# Patient Record
Sex: FEMALE | Race: WHITE | NOT HISPANIC OR LATINO | URBAN - METROPOLITAN AREA
[De-identification: names, ages, dates, MRNs, and addresses within clinical notes are randomized per-mention and may not be internally consistent; named-entity substitution may affect disease eponyms.]

---

## 2018-01-23 ENCOUNTER — INPATIENT (INPATIENT)
Facility: HOSPITAL | Age: 83
LOS: 5 days | Discharge: HOSPICE | End: 2018-01-29
Attending: FAMILY MEDICINE | Admitting: FAMILY MEDICINE
Payer: MEDICARE

## 2018-01-23 VITALS
HEIGHT: 66 IN | DIASTOLIC BLOOD PRESSURE: 55 MMHG | SYSTOLIC BLOOD PRESSURE: 127 MMHG | OXYGEN SATURATION: 100 % | WEIGHT: 149.91 LBS | TEMPERATURE: 99 F | HEART RATE: 100 BPM | RESPIRATION RATE: 20 BRPM

## 2018-01-23 DIAGNOSIS — Z96.643 PRESENCE OF ARTIFICIAL HIP JOINT, BILATERAL: Chronic | ICD-10-CM

## 2018-01-23 LAB
ALBUMIN SERPL ELPH-MCNC: 2.4 G/DL — LOW (ref 3.3–5)
ALBUMIN SERPL ELPH-MCNC: 2.9 G/DL — LOW (ref 3.3–5)
ALP SERPL-CCNC: 120 U/L — SIGNIFICANT CHANGE UP (ref 40–120)
ALP SERPL-CCNC: 98 U/L — SIGNIFICANT CHANGE UP (ref 40–120)
ALT FLD-CCNC: 45 U/L — SIGNIFICANT CHANGE UP (ref 12–78)
ALT FLD-CCNC: 59 U/L — SIGNIFICANT CHANGE UP (ref 12–78)
ANION GAP SERPL CALC-SCNC: 13 MMOL/L — SIGNIFICANT CHANGE UP (ref 5–17)
ANION GAP SERPL CALC-SCNC: 18 MMOL/L — HIGH (ref 5–17)
ANISOCYTOSIS BLD QL: SLIGHT — SIGNIFICANT CHANGE UP
APPEARANCE UR: (no result)
APTT BLD: 30 SEC — SIGNIFICANT CHANGE UP (ref 27.5–37.4)
AST SERPL-CCNC: 20 U/L — SIGNIFICANT CHANGE UP (ref 15–37)
AST SERPL-CCNC: 23 U/L — SIGNIFICANT CHANGE UP (ref 15–37)
BASOPHILS # BLD AUTO: 0 K/UL — SIGNIFICANT CHANGE UP (ref 0–0.2)
BILIRUB SERPL-MCNC: 0.3 MG/DL — SIGNIFICANT CHANGE UP (ref 0.2–1.2)
BILIRUB SERPL-MCNC: 0.3 MG/DL — SIGNIFICANT CHANGE UP (ref 0.2–1.2)
BILIRUB UR-MCNC: NEGATIVE — SIGNIFICANT CHANGE UP
BUN SERPL-MCNC: 120 MG/DL — HIGH (ref 7–23)
BUN SERPL-MCNC: 129 MG/DL — HIGH (ref 7–23)
CALCIUM SERPL-MCNC: 9 MG/DL — SIGNIFICANT CHANGE UP (ref 8.5–10.1)
CALCIUM SERPL-MCNC: 9.5 MG/DL — SIGNIFICANT CHANGE UP (ref 8.5–10.1)
CHLORIDE SERPL-SCNC: 104 MMOL/L — SIGNIFICANT CHANGE UP (ref 96–108)
CHLORIDE SERPL-SCNC: 110 MMOL/L — HIGH (ref 96–108)
CO2 SERPL-SCNC: 13 MMOL/L — LOW (ref 22–31)
CO2 SERPL-SCNC: 16 MMOL/L — LOW (ref 22–31)
COLOR SPEC: (no result)
COMMENT - URINE: SIGNIFICANT CHANGE UP
CREAT SERPL-MCNC: 6.39 MG/DL — HIGH (ref 0.5–1.3)
CREAT SERPL-MCNC: 6.98 MG/DL — HIGH (ref 0.5–1.3)
DIFF PNL FLD: (no result)
EOSINOPHIL # BLD AUTO: 0 K/UL — SIGNIFICANT CHANGE UP (ref 0–0.5)
GLUCOSE SERPL-MCNC: 103 MG/DL — HIGH (ref 70–99)
GLUCOSE SERPL-MCNC: 132 MG/DL — HIGH (ref 70–99)
GLUCOSE UR QL: NEGATIVE MG/DL — SIGNIFICANT CHANGE UP
HCT VFR BLD CALC: 34.6 % — SIGNIFICANT CHANGE UP (ref 34.5–45)
HGB BLD-MCNC: 11.4 G/DL — LOW (ref 11.5–15.5)
INR BLD: 1.18 RATIO — HIGH (ref 0.88–1.16)
KETONES UR-MCNC: (no result)
LEUKOCYTE ESTERASE UR-ACNC: (no result)
LIDOCAIN IGE QN: 245 U/L — SIGNIFICANT CHANGE UP (ref 73–393)
LYMPHOCYTES # BLD AUTO: 1.9 K/UL — SIGNIFICANT CHANGE UP (ref 1–3.3)
LYMPHOCYTES # BLD AUTO: 6 % — LOW (ref 13–44)
MAGNESIUM SERPL-MCNC: 2.8 MG/DL — HIGH (ref 1.6–2.6)
MANUAL DIF COMMENT BLD-IMP: SIGNIFICANT CHANGE UP
MCHC RBC-ENTMCNC: 30.2 PG — SIGNIFICANT CHANGE UP (ref 27–34)
MCHC RBC-ENTMCNC: 32.9 GM/DL — SIGNIFICANT CHANGE UP (ref 32–36)
MCV RBC AUTO: 91.6 FL — SIGNIFICANT CHANGE UP (ref 80–100)
MONOCYTES # BLD AUTO: 1 K/UL — HIGH (ref 0–0.9)
MONOCYTES NFR BLD AUTO: 2 % — SIGNIFICANT CHANGE UP (ref 2–14)
NEUTROPHILS # BLD AUTO: 19.7 K/UL — HIGH (ref 1.8–7.4)
NEUTROPHILS NFR BLD AUTO: 92 % — HIGH (ref 43–77)
NITRITE UR-MCNC: POSITIVE
PH UR: 8 — SIGNIFICANT CHANGE UP (ref 5–8)
PLAT MORPH BLD: NORMAL — SIGNIFICANT CHANGE UP
PLATELET # BLD AUTO: 589 K/UL — HIGH (ref 150–400)
POLYCHROMASIA BLD QL SMEAR: SLIGHT — SIGNIFICANT CHANGE UP
POTASSIUM SERPL-MCNC: 6.1 MMOL/L — HIGH (ref 3.5–5.3)
POTASSIUM SERPL-MCNC: 6.9 MMOL/L — CRITICAL HIGH (ref 3.5–5.3)
POTASSIUM SERPL-SCNC: 6.1 MMOL/L — HIGH (ref 3.5–5.3)
POTASSIUM SERPL-SCNC: 6.9 MMOL/L — CRITICAL HIGH (ref 3.5–5.3)
PROT SERPL-MCNC: 7.8 GM/DL — SIGNIFICANT CHANGE UP (ref 6–8.3)
PROT SERPL-MCNC: 9.4 GM/DL — HIGH (ref 6–8.3)
PROT UR-MCNC: 500 MG/DL
PROTHROM AB SERPL-ACNC: 12.8 SEC — HIGH (ref 9.8–12.7)
RBC # BLD: 3.78 M/UL — LOW (ref 3.8–5.2)
RBC # FLD: 14 % — SIGNIFICANT CHANGE UP (ref 10.3–14.5)
RBC BLD AUTO: SIGNIFICANT CHANGE UP
RBC CASTS # UR COMP ASSIST: (no result) /HPF (ref 0–4)
SODIUM SERPL-SCNC: 135 MMOL/L — SIGNIFICANT CHANGE UP (ref 135–145)
SODIUM SERPL-SCNC: 139 MMOL/L — SIGNIFICANT CHANGE UP (ref 135–145)
SP GR SPEC: 1.01 — SIGNIFICANT CHANGE UP (ref 1.01–1.02)
UROBILINOGEN FLD QL: NEGATIVE MG/DL — SIGNIFICANT CHANGE UP
WBC # BLD: 22.7 K/UL — HIGH (ref 3.8–10.5)
WBC # FLD AUTO: 22.7 K/UL — HIGH (ref 3.8–10.5)
WBC UR QL: (no result)

## 2018-01-23 PROCEDURE — 93010 ELECTROCARDIOGRAM REPORT: CPT

## 2018-01-23 PROCEDURE — 71045 X-RAY EXAM CHEST 1 VIEW: CPT | Mod: 26

## 2018-01-23 PROCEDURE — 99285 EMERGENCY DEPT VISIT HI MDM: CPT

## 2018-01-23 RX ORDER — CEFTRIAXONE 500 MG/1
1000 INJECTION, POWDER, FOR SOLUTION INTRAMUSCULAR; INTRAVENOUS ONCE
Qty: 0 | Refills: 0 | Status: COMPLETED | OUTPATIENT
Start: 2018-01-23 | End: 2018-01-23

## 2018-01-23 RX ORDER — SODIUM CHLORIDE 9 MG/ML
2000 INJECTION INTRAMUSCULAR; INTRAVENOUS; SUBCUTANEOUS ONCE
Qty: 0 | Refills: 0 | Status: COMPLETED | OUTPATIENT
Start: 2018-01-23 | End: 2018-01-23

## 2018-01-23 RX ORDER — SODIUM BICARBONATE 1 MEQ/ML
0.17 SYRINGE (ML) INTRAVENOUS
Qty: 150 | Refills: 0 | Status: DISCONTINUED | OUTPATIENT
Start: 2018-01-23 | End: 2018-01-24

## 2018-01-23 RX ORDER — CEFTRIAXONE 500 MG/1
1 INJECTION, POWDER, FOR SOLUTION INTRAMUSCULAR; INTRAVENOUS ONCE
Qty: 0 | Refills: 0 | Status: DISCONTINUED | OUTPATIENT
Start: 2018-01-23 | End: 2018-01-23

## 2018-01-23 RX ORDER — CALCIUM GLUCONATE 100 MG/ML
1 VIAL (ML) INTRAVENOUS ONCE
Qty: 0 | Refills: 0 | Status: COMPLETED | OUTPATIENT
Start: 2018-01-23 | End: 2018-01-23

## 2018-01-23 RX ORDER — DOCUSATE SODIUM 100 MG
100 CAPSULE ORAL THREE TIMES A DAY
Qty: 0 | Refills: 0 | Status: DISCONTINUED | OUTPATIENT
Start: 2018-01-23 | End: 2018-01-29

## 2018-01-23 RX ORDER — SODIUM POLYSTYRENE SULFONATE 4.1 MEQ/G
30 POWDER, FOR SUSPENSION ORAL ONCE
Qty: 0 | Refills: 0 | Status: COMPLETED | OUTPATIENT
Start: 2018-01-23 | End: 2018-01-23

## 2018-01-23 RX ORDER — SODIUM CHLORIDE 9 MG/ML
1000 INJECTION INTRAMUSCULAR; INTRAVENOUS; SUBCUTANEOUS ONCE
Qty: 0 | Refills: 0 | Status: COMPLETED | OUTPATIENT
Start: 2018-01-23 | End: 2018-01-23

## 2018-01-23 RX ORDER — CARVEDILOL PHOSPHATE 80 MG/1
6.25 CAPSULE, EXTENDED RELEASE ORAL EVERY 12 HOURS
Qty: 0 | Refills: 0 | Status: DISCONTINUED | OUTPATIENT
Start: 2018-01-23 | End: 2018-01-29

## 2018-01-23 RX ORDER — SENNA PLUS 8.6 MG/1
2 TABLET ORAL AT BEDTIME
Qty: 0 | Refills: 0 | Status: DISCONTINUED | OUTPATIENT
Start: 2018-01-23 | End: 2018-01-29

## 2018-01-23 RX ORDER — ONDANSETRON 8 MG/1
4 TABLET, FILM COATED ORAL EVERY 6 HOURS
Qty: 0 | Refills: 0 | Status: DISCONTINUED | OUTPATIENT
Start: 2018-01-23 | End: 2018-01-29

## 2018-01-23 RX ORDER — LEVOTHYROXINE SODIUM 125 MCG
50 TABLET ORAL DAILY
Qty: 0 | Refills: 0 | Status: DISCONTINUED | OUTPATIENT
Start: 2018-01-23 | End: 2018-01-29

## 2018-01-23 RX ORDER — INSULIN HUMAN 100 [IU]/ML
10 INJECTION, SOLUTION SUBCUTANEOUS ONCE
Qty: 0 | Refills: 0 | Status: COMPLETED | OUTPATIENT
Start: 2018-01-23 | End: 2018-01-23

## 2018-01-23 RX ORDER — SODIUM BICARBONATE 1 MEQ/ML
50 SYRINGE (ML) INTRAVENOUS ONCE
Qty: 0 | Refills: 0 | Status: COMPLETED | OUTPATIENT
Start: 2018-01-23 | End: 2018-01-23

## 2018-01-23 RX ORDER — DEXTROSE 50 % IN WATER 50 %
50 SYRINGE (ML) INTRAVENOUS ONCE
Qty: 0 | Refills: 0 | Status: COMPLETED | OUTPATIENT
Start: 2018-01-23 | End: 2018-01-23

## 2018-01-23 RX ORDER — SIMVASTATIN 20 MG/1
20 TABLET, FILM COATED ORAL AT BEDTIME
Qty: 0 | Refills: 0 | Status: DISCONTINUED | OUTPATIENT
Start: 2018-01-23 | End: 2018-01-29

## 2018-01-23 RX ADMIN — Medication 50 MILLIEQUIVALENT(S): at 16:52

## 2018-01-23 RX ADMIN — SODIUM CHLORIDE 2000 MILLILITER(S): 9 INJECTION INTRAMUSCULAR; INTRAVENOUS; SUBCUTANEOUS at 16:46

## 2018-01-23 RX ADMIN — CEFTRIAXONE 1000 MILLIGRAM(S): 500 INJECTION, POWDER, FOR SOLUTION INTRAMUSCULAR; INTRAVENOUS at 18:32

## 2018-01-23 RX ADMIN — Medication 75 MEQ/KG/HR: at 20:30

## 2018-01-23 RX ADMIN — Medication 100 GRAM(S): at 17:18

## 2018-01-23 RX ADMIN — INSULIN HUMAN 10 UNIT(S): 100 INJECTION, SOLUTION SUBCUTANEOUS at 16:52

## 2018-01-23 RX ADMIN — Medication 50 MILLILITER(S): at 16:42

## 2018-01-23 NOTE — ED ADULT NURSE REASSESSMENT NOTE - NS ED NURSE REASSESS COMMENT FT1
Pt. daughter at bedside to assist with soothing pt. Pt. very anxious when moved. Will continue to monitor.

## 2018-01-23 NOTE — H&P ADULT - NSHPPHYSICALEXAM_GEN_ALL_CORE
Vital Signs Last 24 Hrs  T(C): 36 (23 Jan 2018 15:43), Max: 37.1 (23 Jan 2018 13:36)  T(F): 96.8 (23 Jan 2018 15:43), Max: 98.7 (23 Jan 2018 13:36)  HR: 100 (23 Jan 2018 13:36) (100 - 100)  BP: 127/55 (23 Jan 2018 13:36) (127/55 - 127/55)  RR: 20 (23 Jan 2018 13:36) (20 - 20)  SpO2: 100% (23 Jan 2018 13:36) (100% - 100%)

## 2018-01-23 NOTE — H&P ADULT - ASSESSMENT
89 y/o F PMHx significant for HTN, Hypothyroidism, Dementia, anxiety, depression, and CKD3 who was BIBA from [facility] for further evaluation of decreased PO intake, increased lethargy over the past few days. Per Assisted living documentation the patient had signs of possible hematuria vs vaginal bleeding. The patient was unable to provide adequate hx due to her decreased mental status. In the ED the patient was found to have several lab abnormalities -> WBC 22.7, , K 6.9, BUN/Cr, 129/6.98, UA (+). In the ED the patient was given Ca gluconate 1g IV x 1, Reg Insulin 10units IV x 1, NaCO3 50mEq IV x 1, Kayexalate 30g po x 1, and D50 x 1amp, NS x 2L, and Ceftriaxone 1g IV x 1. In the ED Nephrology was consulted for further management. 89 y/o F PMHx significant for HTN, Hypothyroidism, Dementia, anxiety, depression, and CKD3 who was BIBA from [facility] for further evaluation of decreased PO intake, increased lethargy over the past few days. Per Assisted living documentation the patient had signs of possible hematuria vs vaginal bleeding. The patient was unable to provide adequate hx due to her decreased mental status. In the ED the patient was found to have several lab abnormalities -> WBC 22.7, , K 6.9, BUN/Cr, 129/6.98, UA (+). In the ED the patient was given Ca gluconate 1g IV x 1, Reg Insulin 10units IV x 1, NaCO3 50mEq IV x 1, Kayexalate 30g po x 1, and D50 x 1amp, NS x 2L, and Ceftriaxone 1g IV x 1. In the ED Nephrology was consulted for further management.     #Toxic-Metabolic Encephalopathy -> Multifactorial due to  #Acute on Chronic Renal Failure with Hyperkalemia   #Hematuria due to Complicated UTI  #Dehydration – Pre-renal Azotemia   ~admit to Telemetry  ~s/p hyperkalemia cocktail (received Ca gluconate 1g IV x 1, Reg Insulin 10units IV x 1, NaCO3 50mEq IV x 1, Kayexalate 30g po x 1, and D50 x 1amp, NS x 2L in the ED)  ~will cont. NaHCO3 gtt per protocol  ~serial labs  ~gong catheter inserted in the ED  ~strict I/Os  ~fall precautions  ~f/u urine studies  ~f/u PAN C+S  ~cont. Ceftriaxone 1g IV daily  ~f/u w/ Nephrology  ~per Nephrology no need for emergent dialysis at this time  ~hold all nephrotoxic agents (takes Enalapril, and Lasix)    2)Advance Care Planning  ~the patient’s advance directives were discussed extensively at the bedside with the patient’s HCP Chayo Mcintosh (daughter) [phone] and the patient’s current code status (DNR) is to remain in place. MOLST forms as well as appropriate [facility] inpatient documentation were signed accordingly. Total time; 16 minutes.    3)CAD/HTN  ~cont. Carvedilol 6.25mg po q12h  ~will hold Enalapril 5mg po daily for now    4)Hypothyroidism  ~cont. Levothyroxine 50mcg po qAM    5)Depression/Anxiety  ~takes Amitriptyline 25mg po qHS    6)Hyperlipidemia  ~takes Simvastatin 20mg po qHS (Renal dosing is 5mg po qHS)    7)Vte ppx  ~cont. Heparin sq 89 y/o F PMHx significant for HTN, Hypothyroidism, Dementia, anxiety, depression, and CKD3 who was BIBA from [facility] for further evaluation of decreased PO intake, increased lethargy over the past few days. Per Assisted living documentation the patient had signs of possible hematuria vs vaginal bleeding. The patient was unable to provide adequate hx due to her decreased mental status. In the ED the patient was found to have several lab abnormalities -> WBC 22.7, , K 6.9, BUN/Cr, 129/6.98, UA (+). In the ED the patient was given Ca gluconate 1g IV x 1, Reg Insulin 10units IV x 1, NaCO3 50mEq IV x 1, Kayexalate 30g po x 1, and D50 x 1amp, NS x 2L, and Ceftriaxone 1g IV x 1. In the ED Nephrology was consulted for further management.     #Toxic-Metabolic Encephalopathy -> Multifactorial due to  #Acute on Chronic Renal Failure with Hyperkalemia   #Hematuria due to Complicated UTI  #Dehydration – Pre-renal Azotemia   ~admit to Telemetry  ~s/p hyperkalemia cocktail (received Ca gluconate 1g IV x 1, Reg Insulin 10units IV x 1, NaCO3 50mEq IV x 1, Kayexalate 30g po x 1, and D50 x 1amp, NS x 2L in the ED)  ~will cont. NaHCO3 gtt per protocol  ~serial labs  ~gong catheter inserted in the ED  ~strict I/Os  ~fall precautions  ~f/u urine studies  ~f/u PAN C+S  ~cont. Ceftriaxone 1g IV daily  ~f/u w/ Nephrology  ~per Nephrology no need for emergent dialysis at this time  ~hold all nephrotoxic agents (takes Enalapril, and Lasix)    2)Advance Care Planning  ~the patient’s advance directives were discussed extensively at the bedside with the patient’s HCP Chayo Mcintosh (daughter) [phone] and the patient’s current code status (DNR) is to remain in place. MOLST forms as well as appropriate [facility] inpatient documentation were signed accordingly. HCP/family do not want aggressive measures at this time. IV Fluids and Antibiotics are acceptable. Total time; 16 minutes.    3)CAD/HTN  ~cont. Carvedilol 6.25mg po q12h  ~will hold Enalapril 5mg po daily for now    4)Hypothyroidism  ~cont. Levothyroxine 50mcg po qAM    5)Depression/Anxiety  ~takes Amitriptyline 25mg po qHS    6)Hyperlipidemia  ~takes Simvastatin 20mg po qHS (Renal dosing is 5mg po qHS)    7)Vte ppx  ~cont. Heparin sq 89 y/o F PMHx significant for HTN, Hypothyroidism, Dementia, anxiety, depression, and CKD3 who was BIBA from [facility] for further evaluation of decreased PO intake, increased lethargy over the past few days. Per Assisted living documentation the patient had signs of possible hematuria vs vaginal bleeding. The patient was unable to provide adequate hx due to her decreased mental status. In the ED the patient was found to have several lab abnormalities -> WBC 22.7, , K 6.9, BUN/Cr, 129/6.98, UA (+). In the ED the patient was given Ca gluconate 1g IV x 1, Reg Insulin 10units IV x 1, NaCO3 50mEq IV x 1, Kayexalate 30g po x 1, and D50 x 1amp, NS x 2L, and Ceftriaxone 1g IV x 1. In the ED Nephrology was consulted for further management.     #Toxic-Metabolic Encephalopathy -> Multifactorial due to  #Sepsis due to Complicated UTI   #Acute on Chronic Renal Failure Hyperkalemia   #Dehydration – Pre-renal Azotemia   ~admit to Medicine  ~s/p hyperkalemia cocktail (received Ca gluconate 1g IV x 1, Reg Insulin 10units IV x 1, NaCO3 50mEq IV x 1, Kayexalate 30g po x 1, and D50 x 1amp, NS x 2L in the ED)  ~will cont. NaHCO3 gtt per protocol  ~serial labs  ~gong catheter inserted in the ED  ~strict I/Os  ~fall precautions  ~f/u urine studies  ~f/u PAN C+S  ~cont. Ceftriaxone 1g IV daily  ~f/u w/ Nephrology  ~per Nephrology no need for emergent dialysis at this time  ~hold all nephrotoxic agents (takes Enalapril, and Lasix)    2)Advance Care Planning  ~the patient’s advance directives were discussed extensively at the bedside with the patient’s HCP Chayo Mcintosh (daughter) [phone] and the patient’s current code status (DNR) is to remain in place. MOLST forms as well as appropriate [facility] inpatient documentation were signed accordingly. HCP/family do not want aggressive measures at this time and are agreeable to comfort measures. IV Fluids and Antibiotics are acceptable. Total time; 16 minutes.  ~f/u w/ Palliative care consultation in the am    3)CAD/HTN  ~cont. Carvedilol 6.25mg po q12h  ~will hold Enalapril 5mg po daily for now    4)Hypothyroidism  ~cont. Levothyroxine 50mcg po qAM    5)Depression/Anxiety  ~takes Amitriptyline 25mg po qHS    6)Hyperlipidemia  ~takes Simvastatin 20mg po qHS (Renal dosing is 5mg po qHS)    7)Vte ppx  ~cont. SCDs (pt w/ notable hematuria please reassess in the am)

## 2018-01-23 NOTE — CONSULT NOTE ADULT - ASSESSMENT
89 y/o F PMHx significant for HTN, Hypothyroidism, Dementia, anxiety, depression, and CKD3 who was BIBA from [facility] for further evaluation of decreased PO intake, increased lethargy over the past few days. Per Assisted living documentation the patient had signs of possible hematuria vs vaginal bleeding. The patient was unable to provide adequate hx due to her decreased mental status. In the ED the patient was found to have several lab abnormalities -> WBC 22.7, , K 6.9, BUN/Cr, 129/6.98, UA (+). In the ED the patient was given Ca gluconate 1g IV x 1, Reg Insulin 10units IV x 1, NaCO3 50mEq IV x 1, Kayexalate 30g po x 1, and D50 x 1amp, NS x 2L, and Ceftriaxone 1g IV x 1. In the ED Nephrology was consulted for further management  Above noted.  The patient's daughter is at the bedside.  The patient has been a resident of [facility] since 2009.  The patient is DNR DNI and do not dialyze.  The patient's daughter is an ambulance employee and she is very well aware of her mother's condition.  The patient has dementia not officially diagnosed as Alzheimer's.  She has not been feeling well and acting herself for the past several days.  She is diagnosed with urinary tract infection, possible sepsis, acute kidney injury.  According to the emergency room on January 12 her serum creatinine was 3 mg/dL.  The patient was given a cocktail for hyperkalemia.  She is also receiving IV fluids, has a Harmon catheter placed which shows dark marcela urine.  AK I is most likely due to severe dehydration  We'll order repeat labs at 8 PM.  The patient was unable to take the Kayexalate, as per the patient's daughter, due to her inability to swallow from dementia and dry mouth.  No heroic efforts.

## 2018-01-23 NOTE — PATIENT PROFILE ADULT. - FUNCTIONAL SCREEN CURRENT LEVEL: COMMUNICATION, MLM
due to advanced dementia/(2) difficulty understanding and speaking (not related to language barrier)

## 2018-01-23 NOTE — CONSULT NOTE ADULT - SUBJECTIVE AND OBJECTIVE BOX
NEPHROLOGY CONSULT  HPI:  89 y/o F PMHx significant for HTN, Hypothyroidism, Dementia, anxiety, depression, and CKD3 who was BIBA from [facility] for further evaluation of decreased PO intake, increased lethargy over the past few days. Per Assisted living documentation the patient had signs of possible hematuria vs vaginal bleeding. The patient was unable to provide adequate hx due to her decreased mental status. In the ED the patient was found to have several lab abnormalities -> WBC 22.7, , K 6.9, BUN/Cr, 129/6.98, UA (+). In the ED the patient was given Ca gluconate 1g IV x 1, Reg Insulin 10units IV x 1, NaCO3 50mEq IV x 1, Kayexalate 30g po x 1, and D50 x 1amp, NS x 2L, and Ceftriaxone 1g IV x 1. In the ED Nephrology was consulted for further management  Above noted.  The patient's daughter is at the bedside.  The patient has been a resident of [facility] since .  The patient is DNR DNI and do not dialyze.  The patient's daughter is an ambulance employee and she is very well aware of her mother's condition.  The patient has dementia not officially diagnosed as Alzheimer's.  She has not been feeling well and acting herself for the past several days.  She is diagnosed with urinary tract infection, possible sepsis, acute kidney injury.  According to the emergency room on  her serum creatinine was 3 mg/dL.  The patient was given a cocktail for hyperkalemia.  She is also receiving IV fluids, has a Harmon catheter placed which shows dark marcela urine.  AK I is most likely due to severe dehydration      PAST MEDICAL & SURGICAL HISTORY:  Dementia  Depression  Anxiety  HTN (hypertension)  H/O bilateral hip replacements      FAMILY HISTORY:  No pertinent family history in first degree relatives    MEDICATIONS  (STANDING):  carvedilol 6.25 milliGRAM(s) Oral every 12 hours  levothyroxine 50 MICROGram(s) Oral daily  simvastatin 20 milliGRAM(s) Oral at bedtime  sodium bicarbonate  Infusion 0.165 mEq/kG/Hr (75 mL/Hr) IV Continuous <Continuous>  sodium chloride 0.9% Bolus 1000 milliLiter(s) IV Bolus once    MEDICATIONS  (PRN):  docusate sodium 100 milliGRAM(s) Oral three times a day PRN Constipation  ondansetron Injectable 4 milliGRAM(s) IV Push every 6 hours PRN Nausea  senna 2 Tablet(s) Oral at bedtime PRN Constipation        Allergies    penicillin (Unknown)  tetanus toxoid (Unknown)    Intolerances        I&O's Summary        REVIEW OF SYSTEMS:  Patient unable to cooperate due to dementia, dehydration, possible sepsis    Vital Signs Last 24 Hrs  T(C): 36 (2018 15:43), Max: 37.1 (2018 13:36)  T(F): 96.8 (2018 15:43), Max: 98.7 (2018 13:36)  HR: 100 (2018 13:36) (100 - 100)  BP: 127/55 (2018 13:36) (127/55 - 127/55)  BP(mean): --  RR: 20 (2018 13:36) (20 - 20)  SpO2: 100% (2018 13:36) (100% - 100%)  Daily Height in cm: 167.64 (2018 13:36)    Daily   I&O's Summary      PHYSICAL EXAM:    General:  Alert, well-developed ,No acute distress.    Neuro:  The patient has dementia, unable to cooperate she is able to answer certain questions by nodding smiling     HEENT:  No JVD, no masses, Eyes anicteric, No carotid bruits.No lymphadenopathy,    Cardiovascular:  Regular rate and rhythm, with normal S1 and S2. No murmurs, rubs,  or gallops. No JVD.     Lungs:  clear. no rales, no wheezing, .    Abdomen:  Normoactive bowel sounds. Soft, flat, non-tender, and non-distended.  No hepatosplenomegaly, positive bowel sounds    Skin:  Warm, dry, well-perfused. No rashes or other lesions.     Extremities:  2+ pulses in upper and lower extremities. No lower extremity pain or  edema; legs are symmetric in appearance.    LABS:                        11.4   22.7  )-----------( 589      ( 2018 14:52 )             34.6         135  |  104  |  129<H>  ----------------------------<  132<H>  6.9<HH>   |  13<L>  |  6.98<H>    Ca    9.5      2018 14:39  Mg     2.8         TPro  9.4<H>  /  Alb  2.9<L>  /  TBili  0.3  /  DBili  x   /  AST  23  /  ALT  59  /  AlkPhos  120      PT/INR - ( 2018 14:39 )   PT: 12.8 sec;   INR: 1.18 ratio         PTT - ( 2018 14:39 )  PTT:30.0 sec  Urinalysis Basic - ( 2018 14:39 )    Color: Red / Appearance: mucous / S.015 / pH: x  Gluc: x / Ketone: Trace  / Bili: Negative / Urobili: Negative mg/dL   Blood: x / Protein: 500 mg/dL / Nitrite: Positive   Leuk Esterase: Moderate / RBC: 11-25 /HPF / WBC 11-25   Sq Epi: x / Non Sq Epi: x / Bacteria: x      Magnesium, Serum: 2.8 mg/dL ( @ 14:39)

## 2018-01-23 NOTE — ED ADULT NURSE NOTE - OBJECTIVE STATEMENT
Pt. daughter reports pt has been increasing more non verbal and having anxiety Pt. daughter reports pt has been increasing more non verbal and having anxiety. Pt. daughter states she has not been eating or drinking.

## 2018-01-23 NOTE — ED ADULT NURSE REASSESSMENT NOTE - NS ED NURSE REASSESS COMMENT FT1
family refused sono of kidney and bladder. family refused 2nd IV placement for normal saline fluids. MD Patsis aware

## 2018-01-23 NOTE — ED PROVIDER NOTE - OBJECTIVE STATEMENT
89 y/o F with a PMhx of dementia, HTN presents to the ED being sent in by nursing facility for decreased PO intake, increased lethargy over the past few days. Nursing home reports that there is a pink tinge to pt diaper, unsure whether vaginal or urinary. Pt unable to provide adequate hx secondary to baseline mental status, currently calm, no distress at this time.

## 2018-01-23 NOTE — ED PROVIDER NOTE - PMH
Anxiety    Dementia    Depression    Hip joint replacement status  bilateral hip replacement  HTN (hypertension)

## 2018-01-23 NOTE — H&P ADULT - NSHPSOCIALHISTORY_GEN_ALL_CORE
Lives at [facility]  Non-smoker

## 2018-01-23 NOTE — H&P ADULT - HISTORY OF PRESENT ILLNESS
89 y/o F PMHx significant for HTN, Hypothyroidism, Dementia, anxiety, depression, and CKD3 who was BIBA from [facility] for further evaluation of decreased PO intake, increased lethargy over the past few days. Per Assisted living documentation the patient had signs of possible hematuria vs vaginal bleeding. The patient was unable to provide adequate hx due to her decreased mental status. In the ED the patient was found to have several lab abnormalities -> WBC 22.7, , K 6.9, BUN/Cr, 129/6.98, UA (+). In the ED the patient was given Ca gluconate 1g IV x 1, Reg Insulin 10units IV x 1, NaCO3 50mEq IV x 1, Kayexalate 30g po x 1, and D50 x 1amp, NS x 2L, and Ceftriaxone 1g IV x 1. In the ED Nephrology was consulted for further management.

## 2018-01-23 NOTE — ED PROVIDER NOTE - PROGRESS NOTE DETAILS
odalis: case discussed with nephrologist Dr Alfaro. he will evaluate patient as an inpatient. no indication for emergent dialysis.

## 2018-01-24 LAB
ALBUMIN SERPL ELPH-MCNC: 2.3 G/DL — LOW (ref 3.3–5)
ALP SERPL-CCNC: 95 U/L — SIGNIFICANT CHANGE UP (ref 40–120)
ALT FLD-CCNC: 39 U/L — SIGNIFICANT CHANGE UP (ref 12–78)
ANION GAP SERPL CALC-SCNC: 12 MMOL/L — SIGNIFICANT CHANGE UP (ref 5–17)
AST SERPL-CCNC: 15 U/L — SIGNIFICANT CHANGE UP (ref 15–37)
BILIRUB SERPL-MCNC: 0.2 MG/DL — SIGNIFICANT CHANGE UP (ref 0.2–1.2)
BUN SERPL-MCNC: 106 MG/DL — HIGH (ref 7–23)
CALCIUM SERPL-MCNC: 8.8 MG/DL — SIGNIFICANT CHANGE UP (ref 8.5–10.1)
CHLORIDE SERPL-SCNC: 107 MMOL/L — SIGNIFICANT CHANGE UP (ref 96–108)
CO2 SERPL-SCNC: 22 MMOL/L — SIGNIFICANT CHANGE UP (ref 22–31)
CREAT ?TM UR-MCNC: 34 MG/DL — SIGNIFICANT CHANGE UP
CREAT SERPL-MCNC: 5.88 MG/DL — HIGH (ref 0.5–1.3)
GLUCOSE SERPL-MCNC: 115 MG/DL — HIGH (ref 70–99)
HCT VFR BLD CALC: 28.5 % — LOW (ref 34.5–45)
HGB BLD-MCNC: 9.4 G/DL — LOW (ref 11.5–15.5)
MAGNESIUM SERPL-MCNC: 2.3 MG/DL — SIGNIFICANT CHANGE UP (ref 1.6–2.6)
MCHC RBC-ENTMCNC: 30 PG — SIGNIFICANT CHANGE UP (ref 27–34)
MCHC RBC-ENTMCNC: 33 GM/DL — SIGNIFICANT CHANGE UP (ref 32–36)
MCV RBC AUTO: 90.8 FL — SIGNIFICANT CHANGE UP (ref 80–100)
PH UR: 8.5 — HIGH (ref 5–8)
PHOSPHATE SERPL-MCNC: 5.8 MG/DL — HIGH (ref 2.5–4.5)
PLATELET # BLD AUTO: 431 K/UL — HIGH (ref 150–400)
POTASSIUM SERPL-MCNC: 4.4 MMOL/L — SIGNIFICANT CHANGE UP (ref 3.5–5.3)
POTASSIUM SERPL-SCNC: 4.4 MMOL/L — SIGNIFICANT CHANGE UP (ref 3.5–5.3)
PROT ?TM UR-MCNC: 297 MG/DL — HIGH (ref 0–12)
PROT SERPL-MCNC: 7.7 GM/DL — SIGNIFICANT CHANGE UP (ref 6–8.3)
RBC # BLD: 3.14 M/UL — LOW (ref 3.8–5.2)
RBC # FLD: 13.9 % — SIGNIFICANT CHANGE UP (ref 10.3–14.5)
SODIUM SERPL-SCNC: 141 MMOL/L — SIGNIFICANT CHANGE UP (ref 135–145)
SODIUM UR-SCNC: 86 MMOL/L — SIGNIFICANT CHANGE UP
TSH SERPL-MCNC: 1.31 UIU/ML — SIGNIFICANT CHANGE UP (ref 0.36–3.74)
WBC # BLD: 16.3 K/UL — HIGH (ref 3.8–10.5)
WBC # FLD AUTO: 16.3 K/UL — HIGH (ref 3.8–10.5)

## 2018-01-24 RX ORDER — ACETAMINOPHEN 500 MG
1000 TABLET ORAL ONCE
Qty: 0 | Refills: 0 | Status: DISCONTINUED | OUTPATIENT
Start: 2018-01-24 | End: 2018-01-25

## 2018-01-24 RX ORDER — CEFTRIAXONE 500 MG/1
1000 INJECTION, POWDER, FOR SOLUTION INTRAMUSCULAR; INTRAVENOUS EVERY 24 HOURS
Qty: 0 | Refills: 0 | Status: DISCONTINUED | OUTPATIENT
Start: 2018-01-24 | End: 2018-01-24

## 2018-01-24 RX ORDER — PHENAZOPYRIDINE HCL 100 MG
100 TABLET ORAL ONCE
Qty: 0 | Refills: 0 | Status: DISCONTINUED | OUTPATIENT
Start: 2018-01-24 | End: 2018-01-24

## 2018-01-24 RX ORDER — CEFTRIAXONE 500 MG/1
1000 INJECTION, POWDER, FOR SOLUTION INTRAMUSCULAR; INTRAVENOUS EVERY 24 HOURS
Qty: 0 | Refills: 0 | Status: DISCONTINUED | OUTPATIENT
Start: 2018-01-24 | End: 2018-01-29

## 2018-01-24 RX ORDER — CEFTRIAXONE 500 MG/1
INJECTION, POWDER, FOR SOLUTION INTRAMUSCULAR; INTRAVENOUS
Qty: 0 | Refills: 0 | Status: DISCONTINUED | OUTPATIENT
Start: 2018-01-24 | End: 2018-01-24

## 2018-01-24 RX ORDER — SODIUM CHLORIDE 9 MG/ML
1000 INJECTION, SOLUTION INTRAVENOUS
Qty: 0 | Refills: 0 | Status: DISCONTINUED | OUTPATIENT
Start: 2018-01-24 | End: 2018-01-26

## 2018-01-24 RX ADMIN — SODIUM CHLORIDE 100 MILLILITER(S): 9 INJECTION, SOLUTION INTRAVENOUS at 13:09

## 2018-01-24 RX ADMIN — CARVEDILOL PHOSPHATE 6.25 MILLIGRAM(S): 80 CAPSULE, EXTENDED RELEASE ORAL at 18:03

## 2018-01-24 RX ADMIN — SIMVASTATIN 20 MILLIGRAM(S): 20 TABLET, FILM COATED ORAL at 22:02

## 2018-01-24 RX ADMIN — CEFTRIAXONE 1000 MILLIGRAM(S): 500 INJECTION, POWDER, FOR SOLUTION INTRAMUSCULAR; INTRAVENOUS at 18:03

## 2018-01-24 RX ADMIN — Medication 75 MEQ/KG/HR: at 12:10

## 2018-01-24 RX ADMIN — Medication 50 MICROGRAM(S): at 06:12

## 2018-01-24 RX ADMIN — CARVEDILOL PHOSPHATE 6.25 MILLIGRAM(S): 80 CAPSULE, EXTENDED RELEASE ORAL at 06:10

## 2018-01-24 NOTE — SWALLOW BEDSIDE ASSESSMENT ADULT - ORAL PHASE
containment of minimal bolus of thin liquid with oral processing: all s/s of distaste/refusal/pain on acceptance of minimal bolus of puree with open m,outh and protrusion in effort to extrude bolus, which was manually removed form pt's mouth. :/Within functional limits

## 2018-01-24 NOTE — PROGRESS NOTE ADULT - SUBJECTIVE AND OBJECTIVE BOX
HOSPITALIST PROGRESS NOTE:  SUBJECTIVE:  PCP: PCP; Dr. Parikh    Chief Complaint: Patient is a 89y old  Female who presents with a chief complaint of AMS (2018 17:37)      HPI:  89 y/o F PMHx significant for HTN, Hypothyroidism, Dementia, anxiety, depression, and CKD3 who was BIBA from [facility] for further evaluation of decreased PO intake, increased lethargy over the past few days. Per Assisted living documentation the patient had signs of possible hematuria vs vaginal bleeding. The patient was unable to provide adequate hx due to her decreased mental status. In the ED the patient was found to have several lab abnormalities -> WBC 22.7, , K 6.9, BUN/Cr, 129/6.98, UA (+). In the ED the patient was given Ca gluconate 1g IV x 1, Reg Insulin 10units IV x 1, NaCO3 50mEq IV x 1, Kayexalate 30g po x 1, and D50 x 1amp, NS x 2L, and Ceftriaxone 1g IV x 1. In the ED Nephrology was consulted for further management. (2018 17:37)    :      Allergies:  penicillin (Unknown)  tetanus toxoid (Unknown)    REVIEW OF SYSTEMS:  See HPI. All other review of systems is negative unless indicated above.     OBJECTIVE  Physical Exam:  Vital Signs:  Height (cm): 167.64 ( @ 13:36)  Weight (kg): 68 ( @ 13:36)  BMI (kg/m2): 24.2 ( @ 13:36)  BSA (m2): 1.77 ( @ 13:36)  Vital Signs Last 24 Hrs  T(C): 36.8 (2018 05:53), Max: 37.2 (2018 23:22)  T(F): 98.2 (2018 05:53), Max: 98.9 (2018 23:22)  HR: 100 (2018 06:08) (98 - 102)  BP: 126/81 (2018 05:53) (126/81 - 151/48)  BP(mean): --  RR: 19 (2018 05:53) (19 - 24)  SpO2: 97% (2018 05:53) (97% - 100%)  I&O's Summary    2018 07:01  -  2018 07:00  --------------------------------------------------------  IN: 0 mL / OUT: 425 mL / NET: -425 mL        Constitutional: NAD, awake and alert, well-developed  Neurological: AAO x 3, no focal deficits  HEENT: PERRLA, EOMI, MMM  Neck: Soft and supple, No LAD, No JVD  Respiratory: Breath sounds are clear bilaterally, No wheezing, rales or rhonchi  Cardiovascular: S1 and S2, regular rate and rhythm; no Murmurs, gallops or rubs  Gastrointestinal: Bowel Sounds present, soft, nontender, nondistended, no guarding, no rebound tenderness  Back: No CVA tenderness   Extremities: No peripheral edema  Vascular: 2+ peripheral pulses  Musculoskeletal: 5/5 strength b/l upper and lower extremities  Skin: No rashes  Breast: Deferred  Rectal: Deferred    MEDICATIONS  (STANDING):  acetaminophen  IVPB. 1000 milliGRAM(s) IV Intermittent once  carvedilol 6.25 milliGRAM(s) Oral every 12 hours  cefTRIAXone Injectable 1000 milliGRAM(s) IV Push every 24 hours  levothyroxine 50 MICROGram(s) Oral daily  simvastatin 20 milliGRAM(s) Oral at bedtime  sodium bicarbonate  Infusion 0.165 mEq/kG/Hr (75 mL/Hr) IV Continuous <Continuous>      LABS: All Labs Reviewed:                        9.4    16.3  )-----------( 431      ( 2018 07:41 )             28.5         141  |  107  |  106<H>  ----------------------------<  115<H>  4.4   |  22  |  5.88<H>    Ca    8.8      2018 07:41  Phos  5.8       Mg     2.3         TPro  7.7  /  Alb  2.3<L>  /  TBili  0.2  /  DBili  x   /  AST  15  /  ALT  39  /  AlkPhos  95      PT/INR - ( 2018 14:39 )   PT: 12.8 sec;   INR: 1.18 ratio         PTT - ( 2018 14:39 )  PTT:30.0 sec        Urinalysis Basic - ( 2018 14:39 )    Color: Red / Appearance: mucous / S.015 / pH: x  Gluc: x / Ketone: Trace  / Bili: Negative / Urobili: Negative mg/dL   Blood: x / Protein: 500 mg/dL / Nitrite: Positive   Leuk Esterase: Moderate / RBC: 11-25 /HPF / WBC 11-25   Sq Epi: x / Non Sq Epi: x / Bacteria: x    RADIOLOGY/EKG:    < from: Xray Chest 1 View AP- PORTABLE-Urgent (18 @ 16:42) >    FINDINGS/  IMPRESSION:          There may be a tiny left pleural effusion. No focal consolidation.   Cardiomediastinal silhouette is intact.    < end of copied text > HOSPITALIST PROGRESS NOTE:  SUBJECTIVE:  PCP: PCP; Dr. Parikh    Chief Complaint: Patient is a 89y old  Female who presents with a chief complaint of AMS (2018 17:37)      HPI:  89 y/o F PMHx significant for HTN, Hypothyroidism, Dementia, anxiety, depression, and CKD3 who was BIBA from [facility] for further evaluation of decreased PO intake, increased lethargy over the past few days. Per Assisted living documentation the patient had signs of possible hematuria vs vaginal bleeding. The patient was unable to provide adequate hx due to her decreased mental status. In the ED the patient was found to have several lab abnormalities -> WBC 22.7, , K 6.9, BUN/Cr, 129/6.98, UA (+). In the ED the patient was given Ca gluconate 1g IV x 1, Reg Insulin 10units IV x 1, NaCO3 50mEq IV x 1, Kayexalate 30g po x 1, and D50 x 1amp, NS x 2L, and Ceftriaxone 1g IV x 1. In the ED Nephrology was consulted for further management. (2018 17:37)    :  Patient confused; Gong placed in er and Draining Gross hematuria and Pus.     Allergies:  penicillin (Unknown)  tetanus toxoid (Unknown)    REVIEW OF SYSTEMS:  See HPI. All other review of systems is negative unless indicated above.     OBJECTIVE  Physical Exam:  Vital Signs:  Height (cm): 167.64 ( @ 13:36)  Weight (kg): 68 ( @ 13:36)  BMI (kg/m2): 24.2 ( @ 13:36)  BSA (m2): 1.77 ( @ 13:36)  Vital Signs Last 24 Hrs  T(C): 36.8 (2018 05:53), Max: 37.2 (2018 23:22)  T(F): 98.2 (2018 05:53), Max: 98.9 (2018 23:22)  HR: 100 (2018 06:08) (98 - 102)  BP: 126/81 (2018 05:53) (126/81 - 151/48)  BP(mean): --  RR: 19 (2018 05:53) (19 - 24)  SpO2: 97% (2018 05:53) (97% - 100%)  I&O's Summary    2018 07:01  -  2018 07:00  --------------------------------------------------------  IN: 0 mL / OUT: 425 mL / NET: -425 mL        Constitutional: NAD, awake and alert, well-developed  Neurological: confused, no focal deficits  HEENT: PERRLA, EOMI, MMM  Neck: Soft and supple, No LAD, No JVD  Respiratory: Breath sounds are clear bilaterally, No wheezing, rales or rhonchi  Cardiovascular: S1 and S2, regular rate and rhythm; no Murmurs, gallops or rubs  Gastrointestinal: Bowel Sounds present, soft, nontender, nondistended, no guarding, no rebound tenderness  Back: No CVA tenderness; + gong with Gross hematuria  Extremities: No peripheral edema  Vascular: 2+ peripheral pulses  Musculoskeletal: 5/5 strength b/l upper and lower extremities  Skin: No rashes  Breast: Deferred  Rectal: Deferred    MEDICATIONS  (STANDING):  acetaminophen  IVPB. 1000 milliGRAM(s) IV Intermittent once  carvedilol 6.25 milliGRAM(s) Oral every 12 hours  cefTRIAXone Injectable 1000 milliGRAM(s) IV Push every 24 hours  levothyroxine 50 MICROGram(s) Oral daily  simvastatin 20 milliGRAM(s) Oral at bedtime  sodium bicarbonate  Infusion 0.165 mEq/kG/Hr (75 mL/Hr) IV Continuous <Continuous>      LABS: All Labs Reviewed:                        9.4    16.3  )-----------( 431      ( 2018 07:41 )             28.5     -    141  |  107  |  106<H>  ----------------------------<  115<H>  4.4   |  22  |  5.88<H>    Ca    8.8      2018 07:41  Phos  5.8     -  Mg     2.3     -    TPro  7.7  /  Alb  2.3<L>  /  TBili  0.2  /  DBili  x   /  AST  15  /  ALT  39  /  AlkPhos  95      PT/INR - ( 2018 14:39 )   PT: 12.8 sec;   INR: 1.18 ratio         PTT - ( 2018 14:39 )  PTT:30.0 sec        Urinalysis Basic - ( 2018 14:39 )    Color: Red / Appearance: mucous / S.015 / pH: x  Gluc: x / Ketone: Trace  / Bili: Negative / Urobili: Negative mg/dL   Blood: x / Protein: 500 mg/dL / Nitrite: Positive   Leuk Esterase: Moderate / RBC: 11-25 /HPF / WBC 11-25   Sq Epi: x / Non Sq Epi: x / Bacteria: x    RADIOLOGY/EKG:    < from: Xray Chest 1 View AP- PORTABLE-Urgent (18 @ 16:42) >    FINDINGS/  IMPRESSION:          There may be a tiny left pleural effusion. No focal consolidation.   Cardiomediastinal silhouette is intact.    < end of copied text >

## 2018-01-24 NOTE — SWALLOW BEDSIDE ASSESSMENT ADULT - SWALLOW EVAL: RECOMMENDED FEEDING/EATING TECHNIQUES
maintain upright posture during/after eating for 30 mins/oral hygiene/allow for swallow between intakes/check mouth frequently for oral residue/pocketing/no straws

## 2018-01-24 NOTE — CHART NOTE - NSCHARTNOTEFT_GEN_A_CORE
Upon Nutritional Assessment by the Registered Dietitian your patient was determined to meet criteria / has evidence of the following diagnosis/diagnoses:          [ ]  Mild Protein Calorie Malnutrition        [x]  Moderate Protein Calorie Malnutrition        [ ] Severe Protein Calorie Malnutrition        [ ] Unspecified Protein Calorie Malnutrition        [ ] Underweight / BMI <19        [ ] Morbid Obesity / BMI > 40      Findings as based on:  •  Comprehensive nutrition assessment and consultation  •  Calorie counts (nutrient intake analysis)  •  Food acceptance and intake status from observations by staff  •  Follow up  •  Patient education  •  Intervention secondary to interdisciplinary rounds  •   concerns      Treatment:    The following diet has been recommended:  - Encourage PO intake  -Comfort measures    PROVIDER Section:     By signing this assessment you are acknowledging and agree with the diagnosis/diagnoses assigned by the Registered Dietitian    Comments: Upon Nutritional Assessment by the Registered Dietitian your patient was determined to meet criteria / has evidence of the following diagnosis/diagnoses:          [ ]  Mild Protein Calorie Malnutrition        [x]  Moderate Protein Calorie Malnutrition        [ ] Severe Protein Calorie Malnutrition        [ ] Unspecified Protein Calorie Malnutrition        [ ] Underweight / BMI <19        [ ] Morbid Obesity / BMI > 40      Findings as based on:  •  Comprehensive nutrition assessment and consultation  •  Calorie counts (nutrient intake analysis)  •  Food acceptance and intake status from observations by staff  •  Follow up  •  Patient education  •  Intervention secondary to interdisciplinary rounds  •   concerns      Treatment:    The following diet has been recommended:  - Encourage PO intake  - Comfort measures    PROVIDER Section:     By signing this assessment you are acknowledging and agree with the diagnosis/diagnoses assigned by the Registered Dietitian    Comments:

## 2018-01-24 NOTE — SWALLOW BEDSIDE ASSESSMENT ADULT - SWALLOW EVAL: DIAGNOSIS
Pt retains elements of oral-pharyngeal capacity for po intake, but mental ststau and overall debilitation compromises her ability:

## 2018-01-24 NOTE — CONSULT NOTE ADULT - SUBJECTIVE AND OBJECTIVE BOX
89 y/o F PMHx significant for HTN, Hypothyroidism, Dementia, anxiety, depression, and CKD3 who was BIBA from [facility] for further evaluation of decreased PO intake, increased lethargy over the past few days. Per Assisted living documentation the patient had signs of possible hematuria vs vaginal bleeding. The patient was unable to provide adequate hx due to her decreased mental status. In the ED the patient was found to have several lab abnormalities -> WBC 22.7, , K 6.9, BUN/Cr, 129/6.98, UA (+). In the ED the patient was given Ca gluconate 1g IV x 1, Reg Insulin 10units IV x 1, NaCO3 50mEq IV x 1, Kayexalate 30g po x 1, and D50 x 1amp, NS x 2L, and Ceftriaxone 1g IV x 1. In the ED Nephrology was consulted for further management. (2018 17:37). Palliative medicine consult for GOC discussion    18 Seen and examined at bedside with no family present. Pt awake and answers occasional questions with a word. Unable to formulate a sentence. Appears comfortable    PAIN: ( )Yes   ( X)No  Level:  Location:  Intensity:    /10  Quality:  Aggravating Factors:  Alleviating Factors:  Radiation:  Duration/Timing:  Impact on ADLs:    DYSPNEA: ( ) Yes  (X ) No  Level:    PAST MEDICAL & SURGICAL HISTORY:  Dementia  Depression  Anxiety  HTN (hypertension)  H/O bilateral hip replacements      SOCIAL HX:    L[facility] in [facility]  Hx opiate tolerance ( )YES  ( X)NO    Baseline ADLs  (Prior to Admission)  ( ) Independent   (X )Dependent    FAMILY HISTORY:  No pertinent family history in first degree relatives      Review of Systems:    Anxiety-  Depression-  Physical Discomfort-  Dyspnea-  Constipation-  Diarrhea-  Nausea-  Vomiting-  Anorexia-  Weight Loss-   Cough-  Secretions-  Fatigue-  Weakness-  Delirium-    All other systems reviewed and negative  Unable to obtain/Limited due to: dementia      PHYSICAL EXAM:    Vital Signs Last 24 Hrs  T(C): 36.4 (2018 11:37), Max: 37.2 (2018 23:22)  T(F): 97.5 (2018 11:37), Max: 98.9 (2018 23:22)  HR: 96 (2018 11:37) (96 - 102)  BP: 149/61 (2018 11:37) (126/81 - 151/48)  BP(mean): --  RR: 18 (2018 11:37) (18 - 24)  SpO2: 99% (2018 11:37) (97% - 100%)  Daily Height in cm: 167.64 (2018 13:36)    Daily Weight in k.2 (2018 05:53)    PPSV2:  20 %  FAST: 7C    General: Elderly female in bed in NAD  Mental Status: alert, minimally verbal  HEENT: oral mucosa dry  Lungs: clear to auscultation hannah  Cardiac: S1S2+  GI: abd soft NT ND + BS  : Harmon cath draining hematuria  Ext: No edema  Neuro: dementia      LABS:                        9.4    16.3  )-----------( 431      ( 2018 07:41 )             28.5         141  |  107  |  106<H>  ----------------------------<  115<H>  4.4   |  22  |  5.88<H>    Ca    8.8      2018 07:41  Phos  5.8       Mg     2.3         TPro  7.7  /  Alb  2.3<L>  /  TBili  0.2  /  DBili  x   /  AST  15  /  ALT  39  /  AlkPhos  95  24    PT/INR - ( 2018 14:39 )   PT: 12.8 sec;   INR: 1.18 ratio         PTT - ( 2018 14:39 )  PTT:30.0 sec  Albumin: Albumin, Serum: 2.3 g/dL ( @ 07:41)      Allergies    penicillin (Unknown)  tetanus toxoid (Unknown)    Intolerances      MEDICATIONS  (STANDING):  acetaminophen  IVPB. 1000 milliGRAM(s) IV Intermittent once  carvedilol 6.25 milliGRAM(s) Oral every 12 hours  cefTRIAXone Injectable. 1000 milliGRAM(s) IV Push every 24 hours  levothyroxine 50 MICROGram(s) Oral daily  simvastatin 20 milliGRAM(s) Oral at bedtime  sodium chloride 0.45%. 1000 milliLiter(s) (100 mL/Hr) IV Continuous <Continuous>    MEDICATIONS  (PRN):  docusate sodium 100 milliGRAM(s) Oral three times a day PRN Constipation  ondansetron Injectable 4 milliGRAM(s) IV Push every 6 hours PRN Nausea  senna 2 Tablet(s) Oral at bedtime PRN Constipation      RADIOLOGY/ADDITIONAL STUDIES:    < from: Xray Chest 1 View AP- PORTABLE-Urgent (18 @ 16:42) >  EXAM:  XR CHEST PORTABLE URGENT 1V                            PROCEDURE DATE:  2018          INTERPRETATION:  Exam Date: 2018 4:42 PM    Chest radiograph (one view)         CLINICAL INFORMATION:  change in mental status    TECHNIQUE:  Single frontal view of the chest was obtained.    COMPARISON: 2014    FINDINGS/  IMPRESSION:          There may be a tiny left pleural effusion. No focal consolidation.   Cardiomediastinal silhouette is intact.

## 2018-01-24 NOTE — CHART NOTE - NSCHARTNOTEFT_GEN_A_CORE
This SW spoke with pts daughter Chayo ([phone]) to schedule a family meeting. Family meeting to take place tomorrow 1/25/18 at 1:00PM. Our team will continue to follow.

## 2018-01-24 NOTE — SWALLOW BEDSIDE ASSESSMENT ADULT - SLP GENERAL OBSERVATIONS
on encounter, pt semi reclining in bed: awke with wide open eyes, only fleeting eye contact; no responses to verbal greetings: pt gives all facial and body signs of being confused and frightened, moaning and whimpering

## 2018-01-24 NOTE — DIETITIAN INITIAL EVALUATION ADULT. - OTHER INFO
Consult for Assessment. Pt pmhx noted below. Pt admitted for poor PO intake. Pt noted for h/x dementia unable to provide detailed diet information. Pt with MOLST form states comfort measures only. Nutrition intervention so be limited to food preferences. Consult for Assessment. Pt pmhx noted below. Pt admitted for poor PO intake. Pt noted for h/x dementia unable to provide detailed diet information. Pt with MOLST form states comfort measures only. Nutrition intervention so be limited to food preferences. Pt's family states she is a fair eater, but needs prompts. NFPE performed: Mild to moderate muscle wasting in clavicle , temple, deltoid and inteorssous. Pt with moderate fat wasting in triceps. Pt meets criteria for moderate protein-calorie malnutrition in context of chronic illness.

## 2018-01-24 NOTE — SWALLOW BEDSIDE ASSESSMENT ADULT - PHARYNGEAL PHASE
at least for small bolus volumes of thin liquid, swallow was triggered within age-appropriate limits.  no overt s/s aspiration on limited testing of minimal bolus of thin liquid./Within functional limits

## 2018-01-24 NOTE — SWALLOW BEDSIDE ASSESSMENT ADULT - MUCOSAL QUALITY
Tongue is swollen and red; dry with splotches of yellow material adherent to lingual surface, not removable with gauze and water: pt barely tolerated oral care, exhibited s/s of pain.

## 2018-01-24 NOTE — PROGRESS NOTE ADULT - SUBJECTIVE AND OBJECTIVE BOX
NEPHROLOGY INTERVAL HPI/OVERNIGHT EVENTS:   SY  No acute overnight complications.   Alert and responsive,    Appearing anxious and sad.    HPI:  89 y/o F PMHx significant for HTN, Hypothyroidism, Dementia, anxiety, depression, and CKD3 who was BIBA from [facility] for further evaluation of decreased PO intake, increased lethargy over the past few days. Per Assisted living documentation the patient had signs of possible hematuria vs vaginal bleeding. The patient was unable to provide adequate hx due to her decreased mental status. In the ED the patient was found to have several lab abnormalities -> WBC 22.7, , K 6.9, BUN/Cr, 129/6.98, UA (+). In the ED the patient was given Ca gluconate 1g IV x 1, Reg Insulin 10units IV x 1, NaCO3 50mEq IV x 1, Kayexalate 30g po x 1, and D50 x 1amp, NS x 2L, and Ceftriaxone 1g IV x 1. In the ED Nephrology was consulted for further management  Above noted.  The patient's daughter is at the bedside.  The patient has been a resident of [facility] since .  The patient is DNR DNI and do not dialyze.  The patient's daughter is an ambulance employee and she is very well aware of her mother's condition.  The patient has dementia not officially diagnosed as Alzheimer's.  She has not been feeling well and acting herself for the past several days.  She is diagnosed with urinary tract infection, possible sepsis, acute kidney injury.  According to the emergency room on  her serum creatinine was 3 mg/dL.  The patient was given a cocktail for hyperkalemia.  She is also receiving IV fluids, has a Harmon catheter placed which shows dark marcela urine.  AK I is most likely due to severe dehydration      PAST MEDICAL & SURGICAL HISTORY:  Dementia  Depression  Anxiety  HTN (hypertension)  H/O bilateral hip replacements      MEDICATIONS  (STANDING):  acetaminophen  IVPB. 1000 milliGRAM(s) IV Intermittent once  carvedilol 6.25 milliGRAM(s) Oral every 12 hours  cefTRIAXone Injectable. 1000 milliGRAM(s) IV Push every 24 hours  levothyroxine 50 MICROGram(s) Oral daily  simvastatin 20 milliGRAM(s) Oral at bedtime  sodium bicarbonate  Infusion 0.165 mEq/kG/Hr (75 mL/Hr) IV Continuous <Continuous>    MEDICATIONS  (PRN):  docusate sodium 100 milliGRAM(s) Oral three times a day PRN Constipation  ondansetron Injectable 4 milliGRAM(s) IV Push every 6 hours PRN Nausea  senna 2 Tablet(s) Oral at bedtime PRN Constipation          Vital Signs Last 24 Hrs  T(C): 36.4 (2018 11:37), Max: 37.2 (2018 23:22)  T(F): 97.5 (2018 11:37), Max: 98.9 (2018 23:22)  HR: 96 (2018 11:37) (96 - 102)  BP: 149/61 (2018 11:37) (126/81 - 151/48)  BP(mean): --  RR: 18 (2018 11:37) (18 - 24)  SpO2: 99% (2018 11:37) (97% - 100%)  Daily Height in cm: 167.64 (2018 13:36)    Daily Weight in k.2 (2018 05:53)     @ 07:01  -   @ 07:00  --------------------------------------------------------  IN: 0 mL / OUT: 425 mL / NET: -425 mL        PHYSICAL EXAM:  Alert and responsive,  Knows she is in the hospital  GENERAL: no apparent distress  CHEST/LUNG: Clear to aus  HEART: S1S2 RRR  ABDOMEN: soft  EXTREMITIES: no edema  SKIN:     LABS:                        9.4    16.3  )-----------( 431      ( 2018 07:41 )             28.5         141  |  107  |  106<H>  ----------------------------<  115<H>  4.4   |  22  |  5.88<H>    Ca    8.8      2018 07:41  Phos  5.8       Mg     2.3         TPro  7.7  /  Alb  2.3<L>  /  TBili  0.2  /  DBili  x   /  AST  15  /  ALT  39  /  AlkPhos  95      PT/INR - ( 2018 14:39 )   PT: 12.8 sec;   INR: 1.18 ratio         PTT - ( 2018 14:39 )  PTT:30.0 sec  Urinalysis Basic - ( 2018 14:39 )    Color: Red / Appearance: mucous / S.015 / pH: x  Gluc: x / Ketone: Trace  / Bili: Negative / Urobili: Negative mg/dL   Blood: x / Protein: 500 mg/dL / Nitrite: Positive   Leuk Esterase: Moderate / RBC: 11-25 /HPF / WBC 11-25   Sq Epi: x / Non Sq Epi: x / Bacteria: x      Magnesium, Serum: 2.3 mg/dL ( @ 07:41)  Phosphorus Level, Serum: 5.8 mg/dL ( @ 07:41)  Magnesium, Serum: 2.8 mg/dL ( @ 14:39)          RADIOLOGY & ADDITIONAL TESTS:

## 2018-01-24 NOTE — PROGRESS NOTE ADULT - ASSESSMENT
#Toxic-Metabolic Encephalopathy -> Multifactorial due to  #Sepsis 2ndry to Complicated UTI   #Hyperkalemia and Non AG Metabolic Acidosis 2ndry to Acute on Chronic Renal Failure    #Severe Dehydration – Pre-renal Azotemia  -s/p hyperkalemia cocktail (received Ca gluconate 1g IV x 1, Reg Insulin 10units IV x 1, NaCO3 50mEq IV x 1, Kayexalate 30g po x 1, and D50 x 1amp, NS x 2L in the ED)  -cont. NaHCO3 gtt per protocol  -monitor Bun/Cr  -gong catheter inserted in the ED  -f/u PAN C+S  -cont. Ceftriaxone 1g IV daily  -Nephrology consult appreciated  -hold all nephrotoxic agents (takes Enalapril, and Lasix)  -FU Kidney Sono    *Anemia of Blood Loss 2ndry to hematuria  -monitor H/H    *CAD/HTN  -cont. Carvedilol 6.25mg po q12h  -hold Enalapril 5mg po daily and Lasix for now    *Hypothyroidism  -cont. Levothyroxine 50mcg po qAM    *Depression/Anxiety  -Amitriptyline 25mg po qHS    *Hyperlipidemia  -Simvastatin 20mg po qHS (Renal dosing is 5mg po qHS)    *Ve ppx  -cont. SCDs (pt w/ notable hematuria please reassess in the am)    *Code Status - HCP Chayo Mcintosh (daughter) [phone] and the patient’s current code status (DNR); HCP/family do not want aggressive measures at this time and are agreeable to comfort measures. IV Fluids and Antibiotics are acceptable. Palliative care consult #Toxic-Metabolic Encephalopathy -> Multifactorial due to  #Sepsis 2ndry to Complicated UTI with Hematuria   #Hyperkalemia and Non AG Metabolic Acidosis 2ndry to Acute on Chronic Renal Failure    #Severe Dehydration – Pre-renal Azotemia  -s/p hyperkalemia cocktail (received Ca gluconate 1g IV x 1, Reg Insulin 10units IV x 1, NaCO3 50mEq IV x 1, Kayexalate 30g po x 1, and D50 x 1amp, NS x 2L in the ED)  -S/P NaHCO3 gtt   -IVF  -monitor Bun/Cr and H/H  -gong catheter inserted in the ED  -f/u PAN C+S  -cont. Ceftriaxone 1g IV daily  -Nephrology consult appreciated  -hold all nephrotoxic agents (takes Enalapril, and Lasix)  -FU Kidney Sono    *Anemia of Blood Loss 2ndry to hematuria  -monitor H/H    *CAD/HTN  -cont. Carvedilol 6.25mg po q12h  -hold Enalapril 5mg po daily and Lasix for now    *Hypothyroidism  -cont. Levothyroxine 50mcg po qAM    *Depression/Anxiety  -Amitriptyline 25mg po qHS    *Hyperlipidemia  -Simvastatin 20mg po qHS (Renal dosing is 5mg po qHS)    *Ve ppx  -cont. SCDs (pt w/ notable hematuria please reassess in the am)    *Code Status - HCP Chayo Mcintosh (daughter) [phone] and the patient’s current code status (DNR); HCP/family do not want aggressive measures at this time and are agreeable to comfort measures. IV Fluids and Antibiotics are acceptable. Palliative care consult

## 2018-01-24 NOTE — SWALLOW BEDSIDE ASSESSMENT ADULT - NS SPL SWALLOW CLINIC TRIAL FT
pt does not have the mental status or the oral-pharyngeal capacity or strength to manage soft mechanical, or even puree.  She retains elements of oral-pharyngeal capacity for po intake, and is best served right now on FULL LIQUID.  In addition, there appears to be an element of odynophagia when she attempts to eat: vs dementia-driven agitation and confusion which leads to behavioral refusal.    Rx  FULL LIQUID.    s/s odynophagia:  ?possible thrush?  in any event, pt MUST HAVE MOUTH CARE as feasible.    Note Palliative care Consults and Follow up.     Disc with Nsg and with NP.  Service will follow for tolerance and upgrade.

## 2018-01-24 NOTE — CONSULT NOTE ADULT - ASSESSMENT
89 y/o F PMHx significant for HTN, Hypothyroidism, Dementia, anxiety, depression, and CKD3 who was BIBA from [facility] for further evaluation of decreased PO intake, increased lethargy over the past few days. Per Assisted living documentation the patient had signs of possible hematuria vs vaginal bleeding. The patient was unable to provide adequate hx due to her decreased mental status. In the ED the patient was found to have several lab abnormalities -> WBC 22.7, , K 6.9, BUN/Cr, 129/6.98, UA (+). In the ED the patient was given Ca gluconate 1g IV x 1, Reg Insulin 10units IV x 1, NaCO3 50mEq IV x 1, Kayexalate 30g po x 1, and D50 x 1amp, NS x 2L, and Ceftriaxone 1g IV x 1. In the ED Nephrology was consulted for further management. (23 Jan 2018 17:37). Palliative medicine consult for GOC discussion    1/24/18 Seen and examined at bedside with no family present. Pt awake and answers occasional questions with a word. Unable to formulate a sentence. Appears comfortable    Assessment and Plan:    1) Sepsis  -UTI  -Leukocytosis  -AMS  -Cont abx as per medicine  2) UTI  -UA noted  -Harmon cath in place draining hematuria  -abx as oredered  3) Altered Mental Status  -? baseline  -FAST 7C  -R/t inf process  -R/T encephalopathy  -supportive care  4) SO on CKD  -Creat> 5  -Hyperkalemia  -Nephrology eval noted  -No HD indicated at present  5) Advanced Directives  -Pt without capacity  -Daughter Chayo Mcintosh surrogate  -DNR/DNI on chart  -Will schedule GOC discussion with Chayo

## 2018-01-24 NOTE — PROGRESS NOTE ADULT - ASSESSMENT
89 y/o F PMHx significant for HTN, Hypothyroidism, Dementia, anxiety, depression, and CKD3 who was BIBA from [facility] for further evaluation of decreased PO intake, increased lethargy over the past few days. Per Assisted living documentation the patient had signs of possible hematuria vs vaginal bleeding. The patient was unable to provide adequate hx due to her decreased mental status. In the ED the patient was found to have several lab abnormalities -> WBC 22.7, , K 6.9, BUN/Cr, 129/6.98, UA (+). In the ED the patient was given Ca gluconate 1g IV x 1, Reg Insulin 10units IV x 1, NaCO3 50mEq IV x 1, Kayexalate 30g po x 1, and D50 x 1amp, NS x 2L, and Ceftriaxone 1g IV x 1. In the ED Nephrology was consulted for further management  Above noted.  The patient's daughter is at the bedside.  The patient has been a resident of [facility] since 2009.  The patient is DNR DNI and do not dialyze.  The patient's daughter is an ambulance employee and she is very well aware of her mother's condition.  The patient has dementia not officially diagnosed as Alzheimer's.  She has not been feeling well and acting herself for the past several days.  She is diagnosed with urinary tract infection, possible sepsis, acute kidney injury.  According to the emergency room on January 12 her serum creatinine was 3 mg/dL.  The patient was given a cocktail for hyperkalemia.  She is also receiving IV fluids, has a Harmon catheter placed which shows dark marcela urine.  AK I is most likely due to severe dehydration  We'll order repeat labs at 8 PM.  The patient was unable to take the Kayexalate, as per the patient's daughter, due to her inability to swallow from dementia and dry mouth.  No heroic efforts.    1/24 SY  --SO slowly improving.  --Electrolyte derangements improving.  --Continue IVF.   d/c hco3.  --Continue abtx.

## 2018-01-24 NOTE — SWALLOW BEDSIDE ASSESSMENT ADULT - SLP PERTINENT HISTORY OF CURRENT PROBLEM
Pt, white female, age 89 hx significant for HTN, Hypothyroidism, Dementia, anxiety, depression, and CKD3 who was BIBA from [facility] for further evaluation of decreased PO intake, increased lethargy over the past few days. Per Assisted living documentation the patient had signs of possible hematuria vs vaginal bleeding.

## 2018-01-24 NOTE — SWALLOW BEDSIDE ASSESSMENT ADULT - ORAL PREPARATORY PHASE
Within functional limits/has slow orientation to eating routines. slow reposnse to presence of spoon at klip with lip purding but reduced aperture and premature closure.  overt facial grimacing. ?pain or anxiety?  pt is unable to make her needs and concerns known

## 2018-01-25 DIAGNOSIS — R31.0 GROSS HEMATURIA: ICD-10-CM

## 2018-01-25 LAB
-  AMIKACIN: SIGNIFICANT CHANGE UP
-  AMPICILLIN/SULBACTAM: SIGNIFICANT CHANGE UP
-  AMPICILLIN: SIGNIFICANT CHANGE UP
-  AZTREONAM: SIGNIFICANT CHANGE UP
-  CEFAZOLIN: SIGNIFICANT CHANGE UP
-  CEFEPIME: SIGNIFICANT CHANGE UP
-  CEFOXITIN: SIGNIFICANT CHANGE UP
-  CEFTAZIDIME: SIGNIFICANT CHANGE UP
-  CEFTRIAXONE: SIGNIFICANT CHANGE UP
-  CIPROFLOXACIN: SIGNIFICANT CHANGE UP
-  ERTAPENEM: SIGNIFICANT CHANGE UP
-  GENTAMICIN: SIGNIFICANT CHANGE UP
-  LEVOFLOXACIN: SIGNIFICANT CHANGE UP
-  MEROPENEM: SIGNIFICANT CHANGE UP
-  NITROFURANTOIN: SIGNIFICANT CHANGE UP
-  PIPERACILLIN/TAZOBACTAM: SIGNIFICANT CHANGE UP
-  TOBRAMYCIN: SIGNIFICANT CHANGE UP
-  TRIMETHOPRIM/SULFAMETHOXAZOLE: SIGNIFICANT CHANGE UP
ANION GAP SERPL CALC-SCNC: 13 MMOL/L — SIGNIFICANT CHANGE UP (ref 5–17)
BUN SERPL-MCNC: 88 MG/DL — HIGH (ref 7–23)
CALCIUM SERPL-MCNC: 9 MG/DL — SIGNIFICANT CHANGE UP (ref 8.5–10.1)
CHLORIDE SERPL-SCNC: 109 MMOL/L — HIGH (ref 96–108)
CO2 SERPL-SCNC: 19 MMOL/L — LOW (ref 22–31)
CREAT SERPL-MCNC: 4.86 MG/DL — HIGH (ref 0.5–1.3)
CULTURE RESULTS: SIGNIFICANT CHANGE UP
GLUCOSE SERPL-MCNC: 109 MG/DL — HIGH (ref 70–99)
HCT VFR BLD CALC: 31.4 % — LOW (ref 34.5–45)
HGB BLD-MCNC: 10 G/DL — LOW (ref 11.5–15.5)
MCHC RBC-ENTMCNC: 29.5 PG — SIGNIFICANT CHANGE UP (ref 27–34)
MCHC RBC-ENTMCNC: 32 GM/DL — SIGNIFICANT CHANGE UP (ref 32–36)
MCV RBC AUTO: 92.2 FL — SIGNIFICANT CHANGE UP (ref 80–100)
METHOD TYPE: SIGNIFICANT CHANGE UP
ORGANISM # SPEC MICROSCOPIC CNT: SIGNIFICANT CHANGE UP
ORGANISM # SPEC MICROSCOPIC CNT: SIGNIFICANT CHANGE UP
PLATELET # BLD AUTO: 448 K/UL — HIGH (ref 150–400)
POTASSIUM SERPL-MCNC: 3.9 MMOL/L — SIGNIFICANT CHANGE UP (ref 3.5–5.3)
POTASSIUM SERPL-SCNC: 3.9 MMOL/L — SIGNIFICANT CHANGE UP (ref 3.5–5.3)
RBC # BLD: 3.4 M/UL — LOW (ref 3.8–5.2)
RBC # FLD: 14.2 % — SIGNIFICANT CHANGE UP (ref 10.3–14.5)
SODIUM SERPL-SCNC: 141 MMOL/L — SIGNIFICANT CHANGE UP (ref 135–145)
SPECIMEN SOURCE: SIGNIFICANT CHANGE UP
WBC # BLD: 17.7 K/UL — HIGH (ref 3.8–10.5)
WBC # FLD AUTO: 17.7 K/UL — HIGH (ref 3.8–10.5)

## 2018-01-25 PROCEDURE — 76770 US EXAM ABDO BACK WALL COMP: CPT | Mod: 26

## 2018-01-25 PROCEDURE — 99232 SBSQ HOSP IP/OBS MODERATE 35: CPT

## 2018-01-25 RX ORDER — ALPRAZOLAM 0.25 MG
0.5 TABLET ORAL ONCE
Qty: 0 | Refills: 0 | Status: DISCONTINUED | OUTPATIENT
Start: 2018-01-25 | End: 2018-01-25

## 2018-01-25 RX ADMIN — Medication 50 MICROGRAM(S): at 05:26

## 2018-01-25 RX ADMIN — Medication 0.5 MILLIGRAM(S): at 10:38

## 2018-01-25 RX ADMIN — CARVEDILOL PHOSPHATE 6.25 MILLIGRAM(S): 80 CAPSULE, EXTENDED RELEASE ORAL at 05:27

## 2018-01-25 RX ADMIN — SIMVASTATIN 20 MILLIGRAM(S): 20 TABLET, FILM COATED ORAL at 22:26

## 2018-01-25 RX ADMIN — SODIUM CHLORIDE 100 MILLILITER(S): 9 INJECTION, SOLUTION INTRAVENOUS at 00:03

## 2018-01-25 RX ADMIN — SODIUM CHLORIDE 100 MILLILITER(S): 9 INJECTION, SOLUTION INTRAVENOUS at 22:25

## 2018-01-25 RX ADMIN — CARVEDILOL PHOSPHATE 6.25 MILLIGRAM(S): 80 CAPSULE, EXTENDED RELEASE ORAL at 17:37

## 2018-01-25 RX ADMIN — SODIUM CHLORIDE 100 MILLILITER(S): 9 INJECTION, SOLUTION INTRAVENOUS at 11:52

## 2018-01-25 RX ADMIN — CEFTRIAXONE 1000 MILLIGRAM(S): 500 INJECTION, POWDER, FOR SOLUTION INTRAMUSCULAR; INTRAVENOUS at 17:23

## 2018-01-25 NOTE — PROGRESS NOTE ADULT - ASSESSMENT
89 y/o F PMHx significant for HTN, Hypothyroidism, Dementia, anxiety, depression, and CKD3 who was BIBA from [facility] for further evaluation of decreased PO intake, increased lethargy over the past few days. Per Assisted living documentation the patient had signs of possible hematuria vs vaginal bleeding. The patient was unable to provide adequate hx due to her decreased mental status. In the ED the patient was found to have several lab abnormalities -> WBC 22.7, , K 6.9, BUN/Cr, 129/6.98, UA (+). In the ED the patient was given Ca gluconate 1g IV x 1, Reg Insulin 10units IV x 1, NaCO3 50mEq IV x 1, Kayexalate 30g po x 1, and D50 x 1amp, NS x 2L, and Ceftriaxone 1g IV x 1. In the ED Nephrology was consulted for further management  Above noted.  The patient's daughter is at the bedside.  The patient has been a resident of [facility] since 2009.  The patient is DNR DNI and do not dialyze.  The patient's daughter is an ambulance employee and she is very well aware of her mother's condition.  The patient has dementia not officially diagnosed as Alzheimer's.  She has not been feeling well and acting herself for the past several days.  She is diagnosed with urinary tract infection, possible sepsis, acute kidney injury.  According to the emergency room on January 12 her serum creatinine was 3 mg/dL.  The patient was given a cocktail for hyperkalemia.  She is also receiving IV fluids, has a Harmon catheter placed which shows dark marcela urine.  AK I is most likely due to severe dehydration  We'll order repeat labs at 8 PM.  The patient was unable to take the Kayexalate, as per the patient's daughter, due to her inability to swallow from dementia and dry mouth.  No heroic efforts.    1/24 SY  --SO slowly improving.  --Electrolyte derangements improving.  --Continue IVF.   d/c hco3.  --Continue abtx    1/25  Patient in bed comfortable,  Drinking orange juice, when handed to her.  Creatinine improving.  Past within normal levels  cont ivf

## 2018-01-25 NOTE — PROGRESS NOTE ADULT - SUBJECTIVE AND OBJECTIVE BOX
89 y/o F PMHx significant for HTN, Hypothyroidism, Dementia, anxiety, depression, and CKD3 who was BIBA from [facility] for further evaluation of decreased PO intake, increased lethargy over the past few days. Per Assisted living documentation the patient had signs of possible hematuria vs vaginal bleeding. The patient was unable to provide adequate hx due to her decreased mental status. In the ED the patient was found to have several lab abnormalities -> WBC 22.7, , K 6.9, BUN/Cr, 129/6.98, UA (+). In the ED the patient was given Ca gluconate 1g IV x 1, Reg Insulin 10units IV x 1, NaCO3 50mEq IV x 1, Kayexalate 30g po x 1, and D50 x 1amp, NS x 2L, and Ceftriaxone 1g IV x 1. In the ED Nephrology was consulted for further management. (23 Jan 2018 17:37). Palliative medicine consult for GOC discussion    1/24/18 Seen and examined at bedside with no family present. Pt awake and answers occasional questions with a word. Unable to formulate a sentence. Appears comfortable  1/25/18 Seen and examined at bedside with no family present. Pt appears lethargic with minimal response to verbal questioning. Denies pain or dyspnea    PAIN: ( )Yes   ( X)No  Level:  Location:  Intensity:    /10  Quality:  Aggravating Factors:  Alleviating Factors:  Radiation:  Duration/Timing:  Impact on ADLs:    DYSPNEA: ( ) Yes  (X ) No  Level:    PAST MEDICAL & SURGICAL HISTORY:  Dementia  Depression  Anxiety  HTN (hypertension)  H/O bilateral hip replacements      SOCIAL HX:    Lives in [facility] unit  Hx opiate tolerance ( )YES  ( X)NO    Baseline ADLs  (Prior to Admission)  ( ) Independent   (X )Dependent    FAMILY HISTORY:  No pertinent family history in first degree relatives      Review of Systems:    Anxiety-mod-severe as per daughter  Depression-  Physical Discomfort-  Dyspnea-none  Constipation-  Diarrhea-  Nausea-  Vomiting-  Anorexia-  Weight Loss-   Cough-  Secretions-  Fatigue-severe  Weakness-mod-severe  Delirium-    All other systems reviewed and negative  Unable to obtain/Limited due to: dementia      PHYSICAL EXAM:    ICU Vital Signs Last 24 Hrs  T(C): 36.4 (25 Jan 2018 11:05), Max: 36.7 (24 Jan 2018 21:18)  T(F): 97.6 (25 Jan 2018 11:05), Max: 98 (24 Jan 2018 21:18)  HR: 106 (25 Jan 2018 11:05) (100 - 106)  BP: 156/72 (25 Jan 2018 11:05) (134/57 - 163/54)  BP(mean): --  ABP: --  ABP(mean): --  RR: 18 (25 Jan 2018 11:05) (17 - 18)  SpO2: 100% (25 Jan 2018 11:05) (97% - 100%)      PPSV2:  20 %  FAST: 7C    General: Elderly female in bed in NAD  Mental Status: alert, minimally verbal  HEENT: oral mucosa dry  Lungs: clear to auscultation hannah  Cardiac: S1S2+  GI: abd soft NT ND + BS  : Harmon cath draining hematuria  Ext: No edema  Neuro: dementia      LABS:                             10.0   17.7  )-----------( 448      ( 25 Jan 2018 07:16 )             31.4                   01-25    141  |  109<H>  |  88<H>  ----------------------------<  109<H>  3.9   |  19<L>  |  4.86<H>    Ca    9.0      25 Jan 2018 07:16  Phos  5.8     01-24  Mg     2.3     01-24    TPro  7.7  /  Alb  2.3<L>  /  TBili  0.2  /  DBili  x   /  AST  15  /  ALT  39  /  AlkPhos  95  01-24       PTT - ( 23 Jan 2018 14:39 )  PTT:30.0 sec  Albumin: Albumin, Serum: 2.3 g/dL (01-24 @ 07:41)      Allergies    penicillin (Unknown)  tetanus toxoid (Unknown)    Intolerances      MEDICATIONS  (STANDING):  acetaminophen  IVPB. 1000 milliGRAM(s) IV Intermittent once  carvedilol 6.25 milliGRAM(s) Oral every 12 hours  cefTRIAXone Injectable. 1000 milliGRAM(s) IV Push every 24 hours  levothyroxine 50 MICROGram(s) Oral daily  simvastatin 20 milliGRAM(s) Oral at bedtime  sodium chloride 0.45%. 1000 milliLiter(s) (100 mL/Hr) IV Continuous <Continuous>    MEDICATIONS  (PRN):  docusate sodium 100 milliGRAM(s) Oral three times a day PRN Constipation  ondansetron Injectable 4 milliGRAM(s) IV Push every 6 hours PRN Nausea  senna 2 Tablet(s) Oral at bedtime PRN Constipation      RADIOLOGY/ADDITIONAL STUDIES:  < from: US Kidney and Bladder (01.25.18 @ 11:11) >    EXAM:  US KIDNEYS AND BLADDER                            PROCEDURE DATE:  01/25/2018          INTERPRETATION:      Ultrasound of the kidneys         CLINICAL INFORMATION:       Hematuria    TECHNIQUE:  Transabdominal sonography was performed.    FINDINGS:   No comparison studies are available for review.    The right kidney measures 10.1 cm in length. Its contours are smooth.  No   focal lesion is found. There is moderate hydronephrosis with debris in   the collecting system. There is hydroureter with multiple stones noted in   the ureter measuring 1 cm, 8 mm and 1.2 cm.    . The left kidney measures 9.2 cm in length. Its contours are smooth.  No   focal lesion is found. There is moderate hydronephrosis containing debris   with multiple stones measuring 1.3 cm in the upper pole and 1.3 and 1 cm   in the lower pole.    The abdominal aorta measures normal caliber anterior to posterior.  The   IVC and retroperitoneal structures appear intact.      IMPRESSION: Lateral hydronephrosis with BILATERAL nephrolithiasis. RIGHT   hydroureter and calculi seen within the ureter. Recommend noncontrast CT   scan for complete evaluation.                    < from: Xray Chest 1 View AP- PORTABLE-Urgent (01.23.18 @ 16:42) >  EXAM:  XR CHEST PORTABLE URGENT 1V                            PROCEDURE DATE:  01/23/2018          INTERPRETATION:  Exam Date: 1/23/2018 4:42 PM    Chest radiograph (one view)         CLINICAL INFORMATION:  change in mental status    TECHNIQUE:  Single frontal view of the chest was obtained.    COMPARISON: June 29 2014    FINDINGS/  IMPRESSION:          There may be a tiny left pleural effusion. No focal consolidation.   Cardiomediastinal silhouette is intact.

## 2018-01-25 NOTE — PHYSICAL THERAPY INITIAL EVALUATION ADULT - CRITERIA FOR SKILLED THERAPEUTIC INTERVENTIONS
risk reduction/prevention/rehab potential/anticipated equipment needs at discharge/therapy frequency/anticipated discharge recommendation/predicted duration of therapy intervention/impairments found/functional limitations in following categories

## 2018-01-25 NOTE — PHYSICAL THERAPY INITIAL EVALUATION ADULT - DIAGNOSIS, PT EVAL
Sepsis, UTI, leukocytosis, AMS, Toxic-Metabolic Encephalopathy, Hyperkalemia and Non AG Metabolic Acidosis 2ndry to Acute on Chronic Renal Failure, severe dehydration, anemia

## 2018-01-25 NOTE — PHYSICAL THERAPY INITIAL EVALUATION ADULT - PERTINENT HX OF CURRENT PROBLEM, REHAB EVAL
decreased PO intake, increased lethargy over the past few days. Per Assisted living documentation the patient had signs of possible hematuria vs vaginal bleeding. Abn labs noted in ED. gong placed draining hematuria, pus. H/H 10.0/31.4.

## 2018-01-25 NOTE — CONSULT NOTE ADULT - SUBJECTIVE AND OBJECTIVE BOX
CHIEF COMPLAINT:Hemturia    HISTORY OF PRESENT ILLNESS:Elerely lady with DNR/DNI, UTI, hematuria    PAST MEDICAL & SURGICAL HISTORY:  Dementia  Depression  Anxiety  HTN (hypertension)  H/O bilateral hip replacements      REVIEW OF SYSTEMS:    CONSTITUTIONAL: Debility of advanced age  EYES/ENT: No visual changes;  No vertigo or throat pain   NECK: No pain or stiffness  RESPIRATORY: No cough, wheezing, hemoptysis; No shortness of breath  CARDIOVASCULAR: No chest pain or palpitations  GASTROINTESTINAL: No abdominal or epigastric pain. No nausea, vomiting, or hematemesis; No diarrhea or constipation. No melena or hematochezia.  GENITOURINARY: Hematuria  NEUROLOGICAL: No numbness or weakness  SKIN: No itching, burning, rashes, or lesions   All other review of systems is negative unless indicated above.    MEDICATIONS  (STANDING):  carvedilol 6.25 milliGRAM(s) Oral every 12 hours  cefTRIAXone Injectable. 1000 milliGRAM(s) IV Push every 24 hours  levothyroxine 50 MICROGram(s) Oral daily  simvastatin 20 milliGRAM(s) Oral at bedtime  sodium chloride 0.45%. 1000 milliLiter(s) (100 mL/Hr) IV Continuous <Continuous>    MEDICATIONS  (PRN):  docusate sodium 100 milliGRAM(s) Oral three times a day PRN Constipation  ondansetron Injectable 4 milliGRAM(s) IV Push every 6 hours PRN Nausea  senna 2 Tablet(s) Oral at bedtime PRN Constipation      Allergies    penicillin (Unknown)  tetanus toxoid (Unknown)    Intolerances:NK        SOCIAL HISTORY:Retired    FAMILY HISTORY:  No pertinent family history in first degree relatives      Vital Signs Last 24 Hrs  T(C): 36.4 (2018 11:05), Max: 36.7 (2018 21:18)  T(F): 97.6 (2018 11:05), Max: 98 (2018 21:18)  HR: 106 (2018 11:05) (100 - 106)  BP: 156/72 (2018 11:05) (134/57 - 163/54)  BP(mean): --  RR: 18 (2018 11:05) (17 - 18)  SpO2: 100% (2018 11:05) (97% - 100%)    PHYSICAL EXAM:    Constitutional:Debility and weakness of advanced age  HEENT: LEONILA, EOMI, Normal Hearing, MMM  Neck: No LAD, No JVD  Back: Normal spine flexure, No CVA tenderness  Respiratory: CTAB   Cardiovascular: S1 and S2, RRR, no M/G/R  Abd: BS+, soft, NT/ND, No CVAT  : Harmon draining blood tinged urine  Extremities: No peripheral edema  Vascular: 2+ peripheral pulses  Neurological: No oriented  Psychiatric: Normal mood, normal affect  Musculoskeletal: 5/5 strength b/l upper and lower extremities  Skin: No rashes    LABS:                        10.0   17.7  )-----------( 448      ( 2018 07:16 )             31.4         141  |  109<H>  |  88<H>  ----------------------------<  109<H>  3.9   |  19<L>  |  4.86<H>    Ca    9.0      2018 07:16  Phos  5.8       Mg     2.3         TPro  7.7  /  Alb  2.3<L>  /  TBili  0.2  /  DBili  x   /  AST  15  /  ALT  39  /  AlkPhos  95      PT/INR - ( 2018 14:39 )   PT: 12.8 sec;   INR: 1.18 ratio         PTT - ( 2018 14:39 )  PTT:30.0 sec  Urinalysis Basic - ( 2018 14:39 )    Color: Red / Appearance: mucous / S.015 / pH: x  Gluc: x / Ketone: Trace  / Bili: Negative / Urobili: Negative mg/dL   Blood: x / Protein: 500 mg/dL / Nitrite: Positive   Leuk Esterase: Moderate / RBC: 11-25 /HPF / WBC 11-25   Sq Epi: x / Non Sq Epi: x / Bacteria: x      Urine Culture:     RADIOLOGY & ADDITIONAL STUDIES:

## 2018-01-25 NOTE — GOALS OF CARE CONVERSATION - PERSONAL ADVANCE DIRECTIVE - CONVERSATION DETAILS
The role of Palliative medicine was reviewed. The pt's daughter discussed her mother's decline regarding her dementia with agitation.  She has noted a change in her status with a decreased appetite as well. We reviewed options at disch including [facility] which she does not want as she feels changes in environment are contributing to her anxiety and agitation. She would like her to receive abx treatment and fluids at this time. She would like a few days to consider plan of care. We discussed home vs inpatient hospice care which she is considering. Spoke with SW regarding contact with [facility] to see if she could return to that environment. 60 minutes spent on advanced care planning. MOLST re completed and previous MOLST removed from chart as per daughter's request

## 2018-01-25 NOTE — CONSULT NOTE ADULT - PROBLEM SELECTOR RECOMMENDATION 9
I would continue the catheter and obvserve as long as this is draining well.  There is no intervention other than keeping her comfortable that is realistic at this time.

## 2018-01-25 NOTE — PROGRESS NOTE ADULT - ASSESSMENT
#Toxic-Metabolic Encephalopathy -> Multifactorial due to  #Sepsis 2ndry to Complicated Proteus Mirabilis UTI   #Hematuria 2ndry to Nephrolithiasis/Obstruction  #Hyperkalemia and Non AG Metabolic Acidosis 2ndry to Acute on Chronic Renal Failure    #Severe Dehydration – Pre-renal Azotemia  #nephrolithiasis  -s/p hyperkalemia cocktail (received Ca gluconate 1g IV x 1, Reg Insulin 10units IV x 1, NaCO3 50mEq IV x 1, Kayexalate 30g po x 1, and D50 x 1amp, NS x 2L in the ED)  -S/P NaHCO3 gtt   -IVF  -monitor Bun/Cr and H/H  -gong catheter inserted in the ED  -Urine Cx - Proteus Mirabilis; FU sensitivity  -cont. Ceftriaxone 1g IV daily  -Nephrology consult appreciated  -Urology consult appreciated  -hold all nephrotoxic agents (takes Enalapril, and Lasix)  -Kidney Sono- Lateral hydronephrosis with BILATERAL nephrolithiasis. RIGHT hydroureter and calculi seen within the ureter.  -Palliative care consult appreciated  - Possible Hospice    *Anemia of Blood Loss 2ndry to hematuria  -monitor H/H  -FU Anemia Panel    *CAD/HTN  -cont. Carvedilol 6.25mg po q12h  -hold Enalapril 5mg po daily and Lasix for now    *Hypothyroidism  -cont. Levothyroxine 50mcg po qAM    *Depression/Anxiety  -Amitriptyline 25mg po qHS    *Hyperlipidemia  -Simvastatin 20mg po qHS (Renal dosing is 5mg po qHS)    *Ve ppx  -cont. SCDs (pt w/ notable hematuria please reassess in the am)    *Code Status - HCP Chayo Mcintosh (daughter) [phone] and the patient’s current code status DNR/DNI, Comfort measures, do not send to hospital, No feeding tube, trial of IV fluids, determine use or limitation of antibiotics, No Dialysis. Pts daughter is open to hospice services in either setting based on pts needs. They would like to give pt. a couple of days to see if there is any improvement before making a final decision.

## 2018-01-25 NOTE — PROGRESS NOTE ADULT - SUBJECTIVE AND OBJECTIVE BOX
NEPHROLOGY INTERVAL HPI/OVERNIGHT EVENTS:  1/24 SY  No acute overnight complications.   Alert and responsive,    Appearing anxious and sad.    1/25  Patient in bed comfortable,  Drinking orange juice, when handed to her.  Creatinine improving.  Past within normal levels    HPI:  89 y/o F PMHx significant for HTN, Hypothyroidism, Dementia, anxiety, depression, and CKD3 who was BIBA from [facility] for further evaluation of decreased PO intake, increased lethargy over the past few days. Per Assisted living documentation the patient had signs of possible hematuria vs vaginal bleeding. The patient was unable to provide adequate hx due to her decreased mental status. In the ED the patient was found to have several lab abnormalities -> WBC 22.7, , K 6.9, BUN/Cr, 129/6.98, UA (+). In the ED the patient was given Ca gluconate 1g IV x 1, Reg Insulin 10units IV x 1, NaCO3 50mEq IV x 1, Kayexalate 30g po x 1, and D50 x 1amp, NS x 2L, and Ceftriaxone 1g IV x 1. In the ED Nephrology was consulted for further management  Above noted.  The patient's daughter is at the bedside.  The patient has been a resident of [facility] since 2009.  The patient is DNR DNI and do not dialyze.  The patient's daughter is an ambulance employee and she is very well aware of her mother's condition.  The patient has dementia not officially diagnosed as Alzheimer's.  She has not been feeling well and acting herself for the past several days.  She is diagnosed with urinary tract infection, possible sepsis, acute kidney injury.  According to the emergency room on January 12 her serum creatinine was 3 mg/dL.  The patient was given a cocktail for hyperkalemia.  She is also receiving IV fluids, has a Harmon catheter placed which shows dark marcela urine.  AK I is most likely due to severe dehydration      PAST MEDICAL & SURGICAL HISTORY:  Dementia  Depression  Anxiety  HTN (hypertension)  H/O bilateral hip replacements      MEDICATIONS  (STANDING):  carvedilol 6.25 milliGRAM(s) Oral every 12 hours  cefTRIAXone Injectable. 1000 milliGRAM(s) IV Push every 24 hours  levothyroxine 50 MICROGram(s) Oral daily  simvastatin 20 milliGRAM(s) Oral at bedtime  sodium chloride 0.45%. 1000 milliLiter(s) (100 mL/Hr) IV Continuous <Continuous>    MEDICATIONS  (PRN):  docusate sodium 100 milliGRAM(s) Oral three times a day PRN Constipation  ondansetron Injectable 4 milliGRAM(s) IV Push every 6 hours PRN Nausea  senna 2 Tablet(s) Oral at bedtime PRN Constipation          Vital Signs Last 24 Hrs  T(C): 36.4 (25 Jan 2018 11:05), Max: 36.7 (24 Jan 2018 21:18)  T(F): 97.6 (25 Jan 2018 11:05), Max: 98 (24 Jan 2018 21:18)  HR: 106 (25 Jan 2018 11:05) (100 - 106)  BP: 156/72 (25 Jan 2018 11:05) (134/57 - 163/54)  BP(mean): --  RR: 18 (25 Jan 2018 11:05) (17 - 18)  SpO2: 100% (25 Jan 2018 11:05) (97% - 100%)      PHYSICAL EXAM:  Alert and responsive,  Knows she is in the hospital  GENERAL: no apparent distress  CHEST/LUNG: Clear to aus  HEART: S1S2 RRR  ABDOMEN: soft  EXTREMITIES: no edema  SKIN:     LABS:                        10.0   17.7  )-----------( 448      ( 25 Jan 2018 07:16 )             31.4       01-25    141  |  109<H>  |  88<H>  ----------------------------<  109<H>  3.9   |  19<L>  |  4.86<H>    Ca    9.0      25 Jan 2018 07:16  Phos  5.8     01-24  Mg     2.3     01-24    TPro  7.7  /  Alb  2.3<L>  /  TBili  0.2  /  DBili  x   /  AST  15  /  ALT  39  /  AlkPhos  95  01-24  :

## 2018-01-25 NOTE — PROGRESS NOTE ADULT - ASSESSMENT
89 y/o F PMHx significant for HTN, Hypothyroidism, Dementia, anxiety, depression, and CKD3 who was BIBA from [facility] for further evaluation of decreased PO intake, increased lethargy over the past few days. Per Assisted living documentation the patient had signs of possible hematuria vs vaginal bleeding. The patient was unable to provide adequate hx due to her decreased mental status. In the ED the patient was found to have several lab abnormalities -> WBC 22.7, , K 6.9, BUN/Cr, 129/6.98, UA (+). In the ED the patient was given Ca gluconate 1g IV x 1, Reg Insulin 10units IV x 1, NaCO3 50mEq IV x 1, Kayexalate 30g po x 1, and D50 x 1amp, NS x 2L, and Ceftriaxone 1g IV x 1. In the ED Nephrology was consulted for further management. (23 Jan 2018 17:37). Palliative medicine consult for GOC discussion    1/24/18 Seen and examined at bedside with no family present. Pt awake and answers occasional questions with a word. Unable to formulate a sentence. Appears comfortable    Assessment and Plan:    1) Sepsis  -UTI  -Leukocytosis  -AMS  -Cont abx as per medicine  2) UTI  -UA noted  -Harmon cath in place draining hematuria  -abx as oredered  3) Altered Mental Status  -? baseline  -FAST 7C  -R/t inf process  -R/T encephalopathy  -supportive care  -Add Ativan 0.5 mg IVP Q6H PRN agiation  4) SO on CKD  -Creat> 5  -Hyperkalemia  -Nephrology eval noted  -No HD indicated at present  -US kidneys =  Lateral hydronephrosis with BILATERAL nephrolithiasis. RIGHT   hydroureter and calculi seen within the ureter. Recommend noncontrast CT   scan for complete evaluation.  5) Advanced Directives  -Pt without capacity  -Daughter Chayo Mcintosh surrogate  -DNR/DNI on chart  -Met with Chayo and her   today 1/25 for GOC discussion   She would like to cont to monitor her and is considering returning to [facility] with hospice care vs inpatient hospice services

## 2018-01-25 NOTE — CHART NOTE - NSCHARTNOTEFT_GEN_A_CORE
Family Meeting Note:    Attendees: pts daughter/HCP KAREN Domingo Nanci Berg NP, Dr. Bolton (Resident), [facility]    Team met with pts family to discuss goals of care, assist with planning and provide supportive counseling. Pt. was residing at [facility] and was walking with a RW prior to admission.     Pts current medical condition and goals of care discussed. Pts dementia and its progression as well as Kidney failure was discussed at length. Pt. also has been eating and drinking minimally. Pts daughter clearly states pt. would not want a PEG or to pursue Dialysis. She recognizes that pt. may not improve and she wants pt. to be comfortable.    Pts daughter was also clear she does not want pt. to go to [facility] as she feel it would not be beneficial for pt. Possible options discussed including back to [place] with hospice (if they would allow) VS inpatient hospice if pt.continues to not eat or drinking and requires IV management for symptoms. Pts daughter discussed how pt. can become very agitated and that it new for her this admission.     MOLST was discussed and completed as well. MOLST states DNR/DNI, Comfort measures, do not send to hospital, No feeding tube, trial of IV fluids, determine use or limitation of antibiotics, No Dialysis.    Pts daughter is open to hospice services in either setting based on pts needs. They would like to give pt. a couple of days to see if there is any improvement before making a final decision. Follow up Meeting Scheduled for Saturday at 11:00AM. Emotional support provided. Our team will continue to follow.

## 2018-01-25 NOTE — PHYSICAL THERAPY INITIAL EVALUATION ADULT - GENERAL OBSERVATIONS, REHAB EVAL
pt rec'd supine in bed on 5S, sleeping, easily arouse. + gong w/ hematuria. pt c/o "not feeling so well", repeating concern re: "not wanting to wet my pants" -PT trying to reassure/ inform pt of gong but pt lacks understanding. Appears anxious.

## 2018-01-26 LAB
ANION GAP SERPL CALC-SCNC: 12 MMOL/L — SIGNIFICANT CHANGE UP (ref 5–17)
BUN SERPL-MCNC: 75 MG/DL — HIGH (ref 7–23)
CALCIUM SERPL-MCNC: 8.5 MG/DL — SIGNIFICANT CHANGE UP (ref 8.5–10.1)
CHLORIDE SERPL-SCNC: 111 MMOL/L — HIGH (ref 96–108)
CO2 SERPL-SCNC: 20 MMOL/L — LOW (ref 22–31)
CREAT SERPL-MCNC: 3.87 MG/DL — HIGH (ref 0.5–1.3)
FERRITIN SERPL-MCNC: 518 NG/ML — HIGH (ref 15–150)
GLUCOSE SERPL-MCNC: 100 MG/DL — HIGH (ref 70–99)
HCT VFR BLD CALC: 30.1 % — LOW (ref 34.5–45)
HGB BLD-MCNC: 9.8 G/DL — LOW (ref 11.5–15.5)
IRON SATN MFR SERPL: 22 % — SIGNIFICANT CHANGE UP (ref 14–50)
IRON SATN MFR SERPL: 28 UG/DL — LOW (ref 30–160)
MCHC RBC-ENTMCNC: 30 PG — SIGNIFICANT CHANGE UP (ref 27–34)
MCHC RBC-ENTMCNC: 32.6 GM/DL — SIGNIFICANT CHANGE UP (ref 32–36)
MCV RBC AUTO: 91.8 FL — SIGNIFICANT CHANGE UP (ref 80–100)
PLATELET # BLD AUTO: 419 K/UL — HIGH (ref 150–400)
POTASSIUM SERPL-MCNC: 4.2 MMOL/L — SIGNIFICANT CHANGE UP (ref 3.5–5.3)
POTASSIUM SERPL-SCNC: 4.2 MMOL/L — SIGNIFICANT CHANGE UP (ref 3.5–5.3)
RBC # BLD: 3.27 M/UL — LOW (ref 3.8–5.2)
RBC # FLD: 14 % — SIGNIFICANT CHANGE UP (ref 10.3–14.5)
SODIUM SERPL-SCNC: 143 MMOL/L — SIGNIFICANT CHANGE UP (ref 135–145)
T4 AB SER-ACNC: 5.8 UG/DL — SIGNIFICANT CHANGE UP (ref 4.6–12)
TIBC SERPL-MCNC: 129 UG/DL — LOW (ref 220–430)
UIBC SERPL-MCNC: 101 UG/DL — LOW (ref 110–370)
VIT B12 SERPL-MCNC: 557 PG/ML — SIGNIFICANT CHANGE UP (ref 232–1245)
WBC # BLD: 15.7 K/UL — HIGH (ref 3.8–10.5)
WBC # FLD AUTO: 15.7 K/UL — HIGH (ref 3.8–10.5)

## 2018-01-26 RX ORDER — MORPHINE SULFATE 50 MG/1
2 CAPSULE, EXTENDED RELEASE ORAL ONCE
Qty: 0 | Refills: 0 | Status: DISCONTINUED | OUTPATIENT
Start: 2018-01-26 | End: 2018-01-26

## 2018-01-26 RX ORDER — FERROUS SULFATE 325(65) MG
325 TABLET ORAL DAILY
Qty: 0 | Refills: 0 | Status: DISCONTINUED | OUTPATIENT
Start: 2018-01-26 | End: 2018-01-29

## 2018-01-26 RX ORDER — SODIUM CHLORIDE 9 MG/ML
1000 INJECTION, SOLUTION INTRAVENOUS
Qty: 0 | Refills: 0 | Status: DISCONTINUED | OUTPATIENT
Start: 2018-01-26 | End: 2018-01-29

## 2018-01-26 RX ORDER — MORPHINE SULFATE 50 MG/1
0.5 CAPSULE, EXTENDED RELEASE ORAL ONCE
Qty: 0 | Refills: 0 | Status: DISCONTINUED | OUTPATIENT
Start: 2018-01-26 | End: 2018-01-26

## 2018-01-26 RX ORDER — HYDROMORPHONE HYDROCHLORIDE 2 MG/ML
0.3 INJECTION INTRAMUSCULAR; INTRAVENOUS; SUBCUTANEOUS
Qty: 0 | Refills: 0 | Status: DISCONTINUED | OUTPATIENT
Start: 2018-01-26 | End: 2018-01-27

## 2018-01-26 RX ORDER — ACETAMINOPHEN 500 MG
650 TABLET ORAL EVERY 6 HOURS
Qty: 0 | Refills: 0 | Status: DISCONTINUED | OUTPATIENT
Start: 2018-01-26 | End: 2018-01-29

## 2018-01-26 RX ORDER — LACTOBACILLUS ACIDOPHILUS 100MM CELL
1 CAPSULE ORAL
Qty: 0 | Refills: 0 | Status: DISCONTINUED | OUTPATIENT
Start: 2018-01-26 | End: 2018-01-29

## 2018-01-26 RX ADMIN — CARVEDILOL PHOSPHATE 6.25 MILLIGRAM(S): 80 CAPSULE, EXTENDED RELEASE ORAL at 19:39

## 2018-01-26 RX ADMIN — Medication 1 TABLET(S): at 19:39

## 2018-01-26 RX ADMIN — SIMVASTATIN 20 MILLIGRAM(S): 20 TABLET, FILM COATED ORAL at 22:00

## 2018-01-26 RX ADMIN — SODIUM CHLORIDE 60 MILLILITER(S): 9 INJECTION, SOLUTION INTRAVENOUS at 22:06

## 2018-01-26 RX ADMIN — MORPHINE SULFATE 2 MILLIGRAM(S): 50 CAPSULE, EXTENDED RELEASE ORAL at 02:55

## 2018-01-26 RX ADMIN — CEFTRIAXONE 1000 MILLIGRAM(S): 500 INJECTION, POWDER, FOR SOLUTION INTRAMUSCULAR; INTRAVENOUS at 19:38

## 2018-01-26 RX ADMIN — Medication 50 MICROGRAM(S): at 06:14

## 2018-01-26 RX ADMIN — Medication 325 MILLIGRAM(S): at 19:38

## 2018-01-26 RX ADMIN — CARVEDILOL PHOSPHATE 6.25 MILLIGRAM(S): 80 CAPSULE, EXTENDED RELEASE ORAL at 06:14

## 2018-01-26 RX ADMIN — Medication 0.5 MILLIGRAM(S): at 01:12

## 2018-01-26 RX ADMIN — MORPHINE SULFATE 2 MILLIGRAM(S): 50 CAPSULE, EXTENDED RELEASE ORAL at 02:31

## 2018-01-26 NOTE — PROGRESS NOTE ADULT - ASSESSMENT
89 y/o F PMHx significant for HTN, Hypothyroidism, Dementia, anxiety, depression, and CKD3 who was BIBA from [facility] for further evaluation of decreased PO intake, increased lethargy over the past few days. Per Assisted living documentation the patient had signs of possible hematuria vs vaginal bleeding. The patient was unable to provide adequate hx due to her decreased mental status. In the ED the patient was found to have several lab abnormalities -> WBC 22.7, , K 6.9, BUN/Cr, 129/6.98, UA (+). In the ED the patient was given Ca gluconate 1g IV x 1, Reg Insulin 10units IV x 1, NaCO3 50mEq IV x 1, Kayexalate 30g po x 1, and D50 x 1amp, NS x 2L, and Ceftriaxone 1g IV x 1. In the ED Nephrology was consulted for further management. (23 Jan 2018 17:37). Palliative medicine consult for GOC discussion    1/24/18 Seen and examined at bedside with no family present. Pt awake and answers occasional questions with a word. Unable to formulate a sentence. Appears comfortable    Assessment and Plan:    1) Sepsis  -UTI  -Leukocytosis  -AMS  -Cont abx as per medicine  2) UTI  -UA noted  -Harmon cath in place draining hematuria  -abx as ordered  3) Altered Mental Status  -? baseline  -FAST 7C  -R/t inf process  -R/T encephalopathy  -supportive care  -Add Ativan 0.5 mg IVP Q6H PRN agiation  -Add Dilaudid 0.3 mg IVP Q3H PRN mod pain  4) SO on CKD  -Creat> 5  -Hyperkalemia  -Nephrology eval noted  -No HD indicated at present  -US kidneys =  Lateral hydronephrosis with BILATERAL nephrolithiasis. RIGHT   hydroureter and calculi seen within the ureter. Recommend noncontrast CT   scan for complete evaluation.  -Urology eval noted  -No intervention indicated  5) Advanced Directives  -Pt without capacity  -Daughter Chayo Mcintosh surrogate  -DNR/DNI on chart  -Met with Chayo and her   today 1/25 for GOC discussion   She would like to cont to monitor her and is considering returning to [facility] with hospice care vs inpatient hospice services  -Follow up meeting scheduled for 1/27/18 11A

## 2018-01-26 NOTE — PROGRESS NOTE ADULT - SUBJECTIVE AND OBJECTIVE BOX
89 y/o F PMHx significant for HTN, Hypothyroidism, Dementia, anxiety, depression, and CKD3 who was BIBA from [facility] for further evaluation of decreased PO intake, increased lethargy over the past few days. Per Assisted living documentation the patient had signs of possible hematuria vs vaginal bleeding. The patient was unable to provide adequate hx due to her decreased mental status. In the ED the patient was found to have several lab abnormalities -> WBC 22.7, , K 6.9, BUN/Cr, 129/6.98, UA (+). In the ED the patient was given Ca gluconate 1g IV x 1, Reg Insulin 10units IV x 1, NaCO3 50mEq IV x 1, Kayexalate 30g po x 1, and D50 x 1amp, NS x 2L, and Ceftriaxone 1g IV x 1. In the ED Nephrology was consulted for further management. (23 Jan 2018 17:37). Palliative medicine consult for GOC discussion    1/24/18 Seen and examined at bedside with no family present. Pt awake and answers occasional questions with a word. Unable to formulate a sentence. Appears comfortable  1/25/18 Seen and examined at bedside with no family present. Pt appears lethargic with minimal response to verbal questioning. Denies pain or dyspnea  1/26/18 Seen and examined at bedside with no family present. Resting comfortably. Denies pain at this time however was moaning and showing signs of pain during the nihht. She received Morphine IVP  PAIN: (X )Yes   ( )No  Level: mod  RN noted signs of pain over night   DYSPNEA: ( ) Yes  (X ) No  Level:    PAST MEDICAL & SURGICAL HISTORY:  Dementia  Depression  Anxiety  HTN (hypertension)  H/O bilateral hip replacements      SOCIAL HX:    Lives in [facility] unit  Hx opiate tolerance ( )YES  ( X)NO    Baseline ADLs  (Prior to Admission)  ( ) Independent   (X )Dependent    FAMILY HISTORY:  No pertinent family history in first degree relatives      Review of Systems:    Anxiety-mod-severe as per daughter  Depression-  Physical Discomfort-  Dyspnea-none  Constipation-  Diarrhea-  Nausea-  Vomiting-  Anorexia-  Weight Loss-   Cough-  Secretions-  Fatigue-severe  Weakness-mod-severe  Delirium-    All other systems reviewed and negative  Unable to obtain/Limited due to: dementia      PHYSICAL EXAM:    ICU Vital Signs Last 24 Hrs  T(C): 36.4 (26 Jan 2018 05:52), Max: 36.6 (25 Jan 2018 21:15)  T(F): 97.5 (26 Jan 2018 05:52), Max: 97.8 (25 Jan 2018 21:15)  HR: 101 (26 Jan 2018 05:52) (81 - 101)  BP: 140/72 (26 Jan 2018 05:52) (140/72 - 156/58)  BP(mean): --  ABP: --  ABP(mean): --  RR: 16 (26 Jan 2018 05:52) (16 - 18)  SpO2: 100% (26 Jan 2018 05:52) (97% - 100%)      PPSV2:  20 %  FAST: 7C    General: Elderly female in bed in NAD  Mental Status: alert, minimally verbal  HEENT: oral mucosa dry  Lungs: clear to auscultation hannah  Cardiac: S1S2+  GI: abd soft mild tenderness RLQ upon deep palpation + BS  : Harmon cath draining hematuria  Ext: No edema  Neuro: dementia      LABS:                             9.8    15.7  )-----------( 419      ( 26 Jan 2018 07:33 )             30.1                      01-26    143  |  111<H>  |  75<H>  ----------------------------<  100<H>  4.2   |  20<L>  |  3.87<H>    Ca    8.5      26 Jan 2018 07:33      Ca    9.0      25 Jan 2018 07:16  Phos  5.8     01-24  Mg     2.3     01-24    TPro  7.7  /  Alb  2.3<L>  /  TBili  0.2  /  DBili  x   /  AST  15  /  ALT  39  /  AlkPhos  95  01-24       PTT - ( 23 Jan 2018 14:39 )  PTT:30.0 sec  Albumin: Albumin, Serum: 2.3 g/dL (01-24 @ 07:41)      Allergies    penicillin (Unknown)  tetanus toxoid (Unknown)      RADIOLOGY/ADDITIONAL STUDIES:  < from: US Kidney and Bladder (01.25.18 @ 11:11) >    EXAM:  US KIDNEYS AND BLADDER                            PROCEDURE DATE:  01/25/2018          INTERPRETATION:      Ultrasound of the kidneys         CLINICAL INFORMATION:       Hematuria    TECHNIQUE:  Transabdominal sonography was performed.    FINDINGS:   No comparison studies are available for review.    The right kidney measures 10.1 cm in length. Its contours are smooth.  No   focal lesion is found. There is moderate hydronephrosis with debris in   the collecting system. There is hydroureter with multiple stones noted in   the ureter measuring 1 cm, 8 mm and 1.2 cm.    . The left kidney measures 9.2 cm in length. Its contours are smooth.  No   focal lesion is found. There is moderate hydronephrosis containing debris   with multiple stones measuring 1.3 cm in the upper pole and 1.3 and 1 cm   in the lower pole.    The abdominal aorta measures normal caliber anterior to posterior.  The   IVC and retroperitoneal structures appear intact.      IMPRESSION: Lateral hydronephrosis with BILATERAL nephrolithiasis. RIGHT   hydroureter and calculi seen within the ureter. Recommend noncontrast CT   scan for complete evaluation.                    < from: Xray Chest 1 View AP- PORTABLE-Urgent (01.23.18 @ 16:42) >  EXAM:  XR CHEST PORTABLE URGENT 1V                            PROCEDURE DATE:  01/23/2018          INTERPRETATION:  Exam Date: 1/23/2018 4:42 PM    Chest radiograph (one view)         CLINICAL INFORMATION:  change in mental status    TECHNIQUE:  Single frontal view of the chest was obtained.    COMPARISON: June 29 2014    FINDINGS/  IMPRESSION:          There may be a tiny left pleural effusion. No focal consolidation.   Cardiomediastinal silhouette is intact.

## 2018-01-26 NOTE — PROVIDER CONTACT NOTE (OTHER) - ACTION/TREATMENT ORDERED:
Dr. Lemus made aware of pain. Dr. Lemus made aware of pain. Received order to administer 2 mg Morphine IV push. Will continue to monitor patient.

## 2018-01-26 NOTE — PROGRESS NOTE ADULT - SUBJECTIVE AND OBJECTIVE BOX
HOSPITALIST PROGRESS NOTE:  SUBJECTIVE:  PCP: PCP; Dr. Parikh    Chief Complaint: Patient is a 89y old  Female who presents with a chief complaint of AMS (23 Jan 2018 17:37)      HPI:  89 y/o F PMHx significant for HTN, Hypothyroidism, Dementia, anxiety, depression, and CKD3 who was BIBA from [facility] for further evaluation of decreased PO intake, increased lethargy over the past few days. Per Assisted living documentation the patient had signs of possible hematuria vs vaginal bleeding. The patient was unable to provide adequate hx due to her decreased mental status. In the ED the patient was found to have several lab abnormalities -> WBC 22.7, , K 6.9, BUN/Cr, 129/6.98, UA (+). In the ED the patient was given Ca gluconate 1g IV x 1, Reg Insulin 10units IV x 1, NaCO3 50mEq IV x 1, Kayexalate 30g po x 1, and D50 x 1amp, NS x 2L, and Ceftriaxone 1g IV x 1. In the ED Nephrology was consulted for further management. (23 Jan 2018 17:37)    1/24:  Patient confused; Gong placed in er and Draining Gross hematuria and Pus.   1/25: Still having gross hematuria via gong cath. Seen by urology.   1/26- Awake- does not follow commands. No overnight events, no new complaints.     Allergies:  penicillin (Unknown)  tetanus toxoid (Unknown)    REVIEW OF SYSTEMS:  See HPI. All other review of systems is negative unless indicated above.     Vital Signs Last 24 Hrs  T(C): 36.7 (26 Jan 2018 11:58), Max: 36.7 (26 Jan 2018 11:58)  T(F): 98 (26 Jan 2018 11:58), Max: 98 (26 Jan 2018 11:58)  HR: 98 (26 Jan 2018 11:58) (81 - 101)  BP: 136/50 (26 Jan 2018 11:58) (136/50 - 156/58)  BP(mean): --  RR: 16 (26 Jan 2018 11:58) (16 - 18)  SpO2: 98% (26 Jan 2018 11:58) (97% - 100%)    Constitutional: NAD, awake and alert, well-developed  Neurological: confused, no focal deficits  HEENT: PERRLA, EOMI, MMM  Neck: Soft and supple, No LAD, No JVD  Respiratory: Breath sounds are clear bilaterally, No wheezing, rales or rhonchi  Cardiovascular: S1 and S2, regular rate and rhythm; no Murmurs, gallops or rubs  Gastrointestinal: Bowel Sounds present, soft, nontender, nondistended, no guarding, no rebound tenderness  Back: No CVA tenderness; + gong with Gross hematuria and puslike material  Extremities: No peripheral edema  Vascular: 2+ peripheral pulses  Musculoskeletal: 5/5 strength b/l upper and lower extremities  Skin: No rashes  Breast: Deferred  Rectal: Deferred    MEDICATIONS  (STANDING):  acetaminophen  IVPB. 1000 milliGRAM(s) IV Intermittent once  carvedilol 6.25 milliGRAM(s) Oral every 12 hours  cefTRIAXone Injectable 1000 milliGRAM(s) IV Push every 24 hours  levothyroxine 50 MICROGram(s) Oral daily  simvastatin 20 milliGRAM(s) Oral at bedtime  sodium bicarbonate  Infusion 0.165 mEq/kG/Hr (75 mL/Hr) IV Continuous <Continuous>    01-26    143  |  111<H>  |  75<H>  ----------------------------<  100<H>  4.2   |  20<L>  |  3.87<H>    Ca    8.5      26 Jan 2018 07:33                          9.8    15.7  )-----------( 419      ( 26 Jan 2018 07:33 )             30.1                         10.0   17.7  )-----------( 448      ( 25 Jan 2018 07:16 )             31.4     01-25    141  |  109<H>  |  88<H>  ----------------------------<  109<H>  3.9   |  19<L>  |  4.86<H>    Ca    9.0      25 Jan 2018 07:16  Phos  5.8     01-24  Mg     2.3     01-24    TPro  7.7  /  Alb  2.3<L>  /  TBili  0.2  /  DBili  x   /  AST  15  /  ALT  39  /  AlkPhos  95  01-24    LIVER FUNCTIONS - ( 24 Jan 2018 07:41 )  Alb: 2.3 g/dL / Pro: 7.7 gm/dL / ALK PHOS: 95 U/L / ALT: 39 U/L / AST: 15 U/L / GGT: x           MICROBIOLOGY/CULTURES:  Culture Results:   >100,000 CFU/ml Proteus mirabilis (01-23 @ 14:46)      RADIOLOGY/EKG:    < from: Xray Chest 1 View AP- PORTABLE-Urgent (01.23.18 @ 16:42) >    FINDINGS/  IMPRESSION:          There may be a tiny left pleural effusion. No focal consolidation.   Cardiomediastinal silhouette is intact.    < end of copied text >      < from: US Kidney and Bladder (01.25.18 @ 11:11) >    IMPRESSION: Lateral hydronephrosis with BILATERAL nephrolithiasis. RIGHT   hydroureter and calculi seen within the ureter. Recommend noncontrast CT   scan for complete evaluation.      < end of copied text >

## 2018-01-26 NOTE — PROGRESS NOTE ADULT - SUBJECTIVE AND OBJECTIVE BOX
NEPHROLOGY INTERVAL HPI/OVERNIGHT EVENTS:  1/24 SY  No acute overnight complications.   Alert and responsive,    Appearing anxious and sad.    1/25  Patient in bed comfortable,  Drinking orange juice, when handed to her.  Creatinine improving.  Potassium within normal levels    HPI:  89 y/o F PMHx significant for HTN, Hypothyroidism, Dementia, anxiety, depression, and CKD3 who was BIBA from [facility] for further evaluation of decreased PO intake, increased lethargy over the past few days. Per Assisted living documentation the patient had signs of possible hematuria vs vaginal bleeding. The patient was unable to provide adequate hx due to her decreased mental status. In the ED the patient was found to have several lab abnormalities -> WBC 22.7, , K 6.9, BUN/Cr, 129/6.98, UA (+). In the ED the patient was given Ca gluconate 1g IV x 1, Reg Insulin 10units IV x 1, NaCO3 50mEq IV x 1, Kayexalate 30g po x 1, and D50 x 1amp, NS x 2L, and Ceftriaxone 1g IV x 1. In the ED Nephrology was consulted for further management  Above noted.  The patient's daughter is at the bedside.  The patient has been a resident of [facility] since 2009.  The patient is DNR DNI and do not dialyze.  The patient's daughter is an ambulance employee and she is very well aware of her mother's condition.  The patient has dementia not officially diagnosed as Alzheimer's.  She has not been feeling well and acting herself for the past several days.  She is diagnosed with urinary tract infection, possible sepsis, acute kidney injury.  According to the emergency room on January 12 her serum creatinine was 3 mg/dL.  The patient was given a cocktail for hyperkalemia.  She is also receiving IV fluids, has a Harmon catheter placed which shows dark marcela urine.  AK I is most likely due to severe dehydration      PAST MEDICAL & SURGICAL HISTORY:  Dementia  Depression  Anxiety  HTN (hypertension)  H/O bilateral hip replacements    MEDICATIONS  (STANDING):  carvedilol 6.25 milliGRAM(s) Oral every 12 hours  cefTRIAXone Injectable. 1000 milliGRAM(s) IV Push every 24 hours  levothyroxine 50 MICROGram(s) Oral daily  simvastatin 20 milliGRAM(s) Oral at bedtime  sodium chloride 0.45%. 1000 milliLiter(s) (100 mL/Hr) IV Continuous <Continuous>    MEDICATIONS  (PRN):  acetaminophen   Tablet 650 milliGRAM(s) Oral every 6 hours PRN pain or fever  docusate sodium 100 milliGRAM(s) Oral three times a day PRN Constipation  HYDROmorphone  Injectable 0.3 milliGRAM(s) IV Push every 3 hours PRN Severe Pain (7 - 10)dyspnea  LORazepam   Injectable 0.5 milliGRAM(s) IV Push every 6 hours PRN Agitation  ondansetron Injectable 4 milliGRAM(s) IV Push every 6 hours PRN Nausea  senna 2 Tablet(s) Oral at bedtime PRN Constipation      Vital Signs Last 24 Hrs  T(C): 36.4 (26 Jan 2018 05:52), Max: 36.6 (25 Jan 2018 21:15)  T(F): 97.5 (26 Jan 2018 05:52), Max: 97.8 (25 Jan 2018 21:15)  HR: 101 (26 Jan 2018 05:52) (81 - 106)  BP: 140/72 (26 Jan 2018 05:52) (140/72 - 156/72)  BP(mean): --  RR: 16 (26 Jan 2018 05:52) (16 - 18)  SpO2: 100% (26 Jan 2018 05:52) (97% - 100%)    PHYSICAL EXAM:  Alert and responsive, confused  GENERAL: no apparent distress  CHEST/LUNG: Clear to aus  HEART: S1S2 RRR  ABDOMEN: soft  EXTREMITIES: no edema  SKIN:                           9.8    15.7  )-----------( 419      ( 26 Jan 2018 07:33 )             30.1     01-26    143  |  111<H>  |  75<H>  ----------------------------<  100<H>  4.2   |  20<L>  |  3.87<H>    Ca    8.5      26 Jan 2018 07:33

## 2018-01-26 NOTE — PROGRESS NOTE ADULT - ASSESSMENT
#Toxic-Metabolic Encephalopathy -> Multifactorial due to  #Sepsis 2ndry to Complicated Proteus Mirabilis UTI   #Hematuria 2ndry to Nephrolithiasis/Obstruction  #Hyperkalemia (resolved) and Non AG Metabolic Acidosis 2ndry to Acute on Chronic Renal Failure    #Severe Dehydration – Pre-renal Azotemia  #nephrolithiasis  -IVF  -monitor Bun/Cr and H/H  -gong catheter inserted in the ED- +hematuria  -Urine Cx - Proteus Mirabilis   -cont. Ceftriaxone 1g IV daily  -Nephrology consult appreciated  -Urology consult appreciated  -hold all nephrotoxic agents (takes Enalapril, and Lasix)  -Kidney Sono- Lateral hydronephrosis with BILATERAL nephrolithiasis. RIGHT hydroureter and calculi seen within the ureter.  -Palliative care consult appreciated  - Possible discharge to hospice.    *Anemia of Blood Loss 2ndry to hematuria  -monitor H/H  -FU Anemia Panel    *CAD/HTN  -cont. Carvedilol 6.25mg po q12h  -hold Enalapril 5mg po daily and Lasix for now    *Hypothyroidism  -cont. Levothyroxine 50mcg po qAM    *Depression/Anxiety  -Amitriptyline 25mg po qHS    *Hyperlipidemia  -Simvastatin 20mg po qHS (Renal dosing is 5mg po qHS)    *Ve ppx  -cont. SCDs (pt w/ notable hematuria please reassess in the am)    *Code Status - HCP Chayo Mcintosh (daughter) [phone] and the patient’s current code status DNR/DNI, Comfort measures, do not send to hospital, No feeding tube, trial of IV fluids, determine use or limitation of antibiotics, No Dialysis. Pts daughter is open to hospice services in either setting based on pts needs. They would like to give pt. a couple of days to see if there is any improvement before making a final decision.   Case d/w team on IDR. #Toxic-Metabolic Encephalopathy -> Multifactorial due to  #Sepsis 2ndry to Complicated Proteus Mirabilis UTI   #Hematuria 2ndry to Nephrolithiasis/Obstruction  #Hyperkalemia (resolved) and Non AG Metabolic Acidosis 2ndry to Acute on Chronic Renal Failure    #Severe Dehydration – Pre-renal Azotemia  #nephrolithiasis  -Decrease IVF  -monitor Bun/Cr and H/H  -gong catheter inserted in the ED- +hematuria  -Urine Cx - Proteus Mirabilis - sensitive to cephalosporin  -cont. Ceftriaxone 1g IV daily#3  -Nephrology consult appreciated   -Urology consult appreciated  -hold all nephrotoxic agents (takes Enalapril, and Lasix)  -Kidney Sono- Lateral hydronephrosis with BILATERAL nephrolithiasis. RIGHT hydroureter and calculi seen within the ureter.  -Palliative care consult appreciated  - Possible discharge to hospice, family     *Anemia of Blood Loss 2ndry to hematuria  -monitor H/H  -Started on Ferrous Sulfate     *CAD/HTN  -cont. Carvedilol 6.25mg po q12h  -hold Enalapril 5mg po daily and Lasix for now    *Hypothyroidism  -cont. Levothyroxine 50mcg po qAM    *Depression/Anxiety  -Amitriptyline 25mg po qHS    *Hyperlipidemia  -Simvastatin 20mg po qHS (Renal dosing is 5mg po qHS)    *Ve ppx  -cont. SCDs (pt w/ notable hematuria please reassess in the am)    *Code Status - HCP Chayo Mcintosh (daughter) [phone] and the patient’s current code status DNR/DNI, Comfort measures, do not send to hospital, No feeding tube, trial of IV fluids, determine use or limitation of antibiotics, No Dialysis. Pts daughter is open to hospice services in either setting based on pts needs. They would like to give pt. a couple of days to see if there is any improvement before making a final decision.   Case d/w team on IDR.

## 2018-01-26 NOTE — PROGRESS NOTE ADULT - ASSESSMENT
89 y/o F PMHx significant for HTN, Hypothyroidism, Dementia, anxiety, depression, and CKD3 who was BIB[facility] for further evaluation of decreased PO intake, increased lethargy over the past few days. Per Assisted living documentation the patient had signs of possible hematuria vs vaginal bleeding. The patient was unable to provide adequate hx due to her decreased mental status. In the ED the patient was found to have several lab abnormalities -> WBC 22.7, , K 6.9, BUN/Cr, 129/6.98, UA (+). In the ED the patient was given Ca gluconate 1g IV x 1, Reg Insulin 10units IV x 1, NaCO3 50mEq IV x 1, Kayexalate 30g po x 1, and D50 x 1amp, NS x 2L, and Ceftriaxone 1g IV x 1. In the ED Nephrology was consulted for further management  Above noted.  The patient's daughter is at the bedside.  The patient has been a resident of [facility] since 2009.  The patient is DNR DNI and do not dialyze.  The patient's daughter is an ambulance employee and she is very well aware of her mother's condition.  The patient has dementia not officially diagnosed as Alzheimer's.  She has not been feeling well and acting herself for the past several days.  She is diagnosed with urinary tract infection, possible sepsis, acute kidney injury.  According to the emergency room on January 12 her serum creatinine was 3 mg/dL.  The patient was given a cocktail for hyperkalemia.  She is also receiving IV fluids, has a Harmon catheter placed which shows dark marcela urine.  AK I is most likely due to severe dehydration  We'll order repeat labs at 8 PM.  The patient was unable to take the Kayexalate, as per the patient's daughter, due to her inability to swallow from dementia and dry mouth.  No heroic efforts.    1/24 SY  --SO slowly improving.  --Electrolyte derangements improving.  --Continue IVF.   d/c hco3.  --Continue abtx    1/25  Patient in bed comfortable,  Drinking orange juice, when handed to her.  Creatinine improving.  Past within normal levels  cont ivf    1/16  creat improving will reduce IVF  K improved

## 2018-01-27 RX ORDER — HYDROMORPHONE HYDROCHLORIDE 2 MG/ML
0.5 INJECTION INTRAMUSCULAR; INTRAVENOUS; SUBCUTANEOUS
Qty: 0 | Refills: 0 | Status: DISCONTINUED | OUTPATIENT
Start: 2018-01-27 | End: 2018-01-29

## 2018-01-27 RX ADMIN — HYDROMORPHONE HYDROCHLORIDE 0.5 MILLIGRAM(S): 2 INJECTION INTRAMUSCULAR; INTRAVENOUS; SUBCUTANEOUS at 22:07

## 2018-01-27 RX ADMIN — Medication 50 MICROGRAM(S): at 06:41

## 2018-01-27 RX ADMIN — HYDROMORPHONE HYDROCHLORIDE 0.3 MILLIGRAM(S): 2 INJECTION INTRAMUSCULAR; INTRAVENOUS; SUBCUTANEOUS at 09:39

## 2018-01-27 RX ADMIN — HYDROMORPHONE HYDROCHLORIDE 0.3 MILLIGRAM(S): 2 INJECTION INTRAMUSCULAR; INTRAVENOUS; SUBCUTANEOUS at 02:31

## 2018-01-27 RX ADMIN — SODIUM CHLORIDE 60 MILLILITER(S): 9 INJECTION, SOLUTION INTRAVENOUS at 16:45

## 2018-01-27 RX ADMIN — CARVEDILOL PHOSPHATE 6.25 MILLIGRAM(S): 80 CAPSULE, EXTENDED RELEASE ORAL at 06:41

## 2018-01-27 RX ADMIN — CEFTRIAXONE 1000 MILLIGRAM(S): 500 INJECTION, POWDER, FOR SOLUTION INTRAMUSCULAR; INTRAVENOUS at 16:44

## 2018-01-27 RX ADMIN — Medication 1 TABLET(S): at 08:16

## 2018-01-27 NOTE — PROGRESS NOTE ADULT - ASSESSMENT
89 y/o F PMHx significant for HTN, Hypothyroidism, Dementia, anxiety, depression, and CKD3 who was BIBA from [facility] for further evaluation of decreased PO intake, increased lethargy over the past few days. Per Assisted living documentation the patient had signs of possible hematuria vs vaginal bleeding. The patient was unable to provide adequate hx due to her decreased mental status. In the ED the patient was found to have several lab abnormalities -> WBC 22.7, , K 6.9, BUN/Cr, 129/6.98, UA (+). In the ED the patient was given Ca gluconate 1g IV x 1, Reg Insulin 10units IV x 1, NaCO3 50mEq IV x 1, Kayexalate 30g po x 1, and D50 x 1amp, NS x 2L, and Ceftriaxone 1g IV x 1. In the ED Nephrology was consulted for further management. (23 Jan 2018 17:37). Palliative medicine consult for GOC discussion    Assessment and Plan:    1) Sepsis  -UTI  -Leukocytosis  -AMS  -Cont abx as per medicine  2) UTI  -UA noted  -Harmon cath in place draining hematuria  -abx as ordered  3) Altered Mental Status  -? baseline  -FAST 7C  -R/t inf process  -R/T encephalopathy  -supportive care  4) Pain  -Cont Ativan 0.5 mg IVP Q6H PRN agitation  -Increase Dilaudid to 0.5 mg IVP Q2H PRN mod pain  5) SO on CKD  -Creat> 5  -Hyperkalemia  -Nephrology eval noted  -No HD indicated at present  -US kidneys =  Lateral hydronephrosis with BILATERAL nephrolithiasis. RIGHT   hydroureter and calculi seen within the ureter. Recommend noncontrast CT   scan for complete evaluation.  -Urology eval noted  -No intervention indicated  6) Advanced Directives  -Pt without capacity  -Daughter Chayo Mcintosh surrogate  -DNR/DNI on chart  -Pall SW met with Chayo and her  again today 1/27 for GOC discussion   -She would like to focus on comfort measures only and transition to inpatient hospice services at [facility] when a bed is available

## 2018-01-27 NOTE — PROGRESS NOTE ADULT - SUBJECTIVE AND OBJECTIVE BOX
HOSPITALIST PROGRESS NOTE:  SUBJECTIVE:  PCP: PCP; Dr. Parikh    Chief Complaint: Patient is a 89y old  Female who presents with a chief complaint of AMS (23 Jan 2018 17:37)      HPI:  89 y/o F PMHx significant for HTN, Hypothyroidism, Dementia, anxiety, depression, and CKD3 who was BIBA from [facility] for further evaluation of decreased PO intake, increased lethargy over the past few days. Per Assisted living documentation the patient had signs of possible hematuria vs vaginal bleeding. The patient was unable to provide adequate hx due to her decreased mental status. In the ED the patient was found to have several lab abnormalities -> WBC 22.7, , K 6.9, BUN/Cr, 129/6.98, UA (+). In the ED the patient was given Ca gluconate 1g IV x 1, Reg Insulin 10units IV x 1, NaCO3 50mEq IV x 1, Kayexalate 30g po x 1, and D50 x 1amp, NS x 2L, and Ceftriaxone 1g IV x 1. In the ED Nephrology was consulted for further management. (23 Jan 2018 17:37)    1/24:  Patient confused; Gong placed in er and Draining Gross hematuria and Pus.   1/25: Still having gross hematuria via gong cath. Seen by urology.   1/26- Awake- does not follow commands. No overnight events, no new complaints.   1/27- Awake, open eyes when name is called. Family at bedside. Discussed lab results     Allergies:  penicillin (Unknown)  tetanus toxoid (Unknown)    REVIEW OF SYSTEMS:  See HPI. All other review of systems is negative unless indicated above.     Vital Signs Last 24 Hrs  T(C): 36.7 (27 Jan 2018 05:00), Max: 37.3 (26 Jan 2018 21:04)  T(F): 98 (27 Jan 2018 05:00), Max: 99.1 (26 Jan 2018 21:04)  HR: 98 (27 Jan 2018 05:00) (94 - 98)  BP: 151/58 (27 Jan 2018 05:00) (129/69 - 151/58)  BP(mean): --  RR: 19 (27 Jan 2018 05:00) (17 - 19)  SpO2: 96% (27 Jan 2018 05:00) (96% - 96%)    Constitutional: NAD, awake and alert, well-developed  Neurological: confused, no focal deficits  HEENT: PERRLA, EOMI, MMM  Neck: Soft and supple, No LAD, No JVD  Respiratory: Breath sounds are clear bilaterally, No wheezing, rales or rhonchi  Cardiovascular: S1 and S2, regular rate and rhythm; no Murmurs, gallops or rubs  Gastrointestinal: Bowel Sounds present, soft, nontender, nondistended, no guarding, no rebound tenderness  Back: No CVA tenderness; + gong with Gross hematuria and puslike material  Extremities: No peripheral edema  Vascular: 2+ peripheral pulses  Musculoskeletal: 5/5 strength b/l upper and lower extremities  Skin: No rashes  Breast: Deferred  Rectal: Deferred    MEDICATIONS  (STANDING):  acetaminophen  IVPB. 1000 milliGRAM(s) IV Intermittent once  carvedilol 6.25 milliGRAM(s) Oral every 12 hours  cefTRIAXone Injectable 1000 milliGRAM(s) IV Push every 24 hours  levothyroxine 50 MICROGram(s) Oral daily  simvastatin 20 milliGRAM(s) Oral at bedtime  sodium bicarbonate  Infusion 0.165 mEq/kG/Hr (75 mL/Hr) IV Continuous <Continuous>    Lab Results:  CBC  CBC Full  -  ( 26 Jan 2018 07:33 )  WBC Count : 15.7 K/uL  Hemoglobin : 9.8 g/dL  Hematocrit : 30.1 %  Platelet Count - Automated : 419 K/uL  Mean Cell Volume : 91.8 fl  Mean Cell Hemoglobin : 30.0 pg  Mean Cell Hemoglobin Concentration : 32.6 gm/dL  Auto Neutrophil # : x  Auto Lymphocyte # : x  Auto Monocyte # : x  Auto Eosinophil # : x  Auto Basophil # : x  Auto Neutrophil % : x  Auto Lymphocyte % : x  Auto Monocyte % : x  Auto Eosinophil % : x  Auto Basophil % : x    .		Differential:	[] Automated		[] Manual  Chemistry                        9.8    15.7  )-----------( 419      ( 26 Jan 2018 07:33 )             30.1     01-26    143  |  111<H>  |  75<H>  ----------------------------<  100<H>  4.2   |  20<L>  |  3.87<H>    Ca    8.5      26 Jan 2018 07:33    MICROBIOLOGY/CULTURES:  Culture Results:   >100,000 CFU/ml Proteus mirabilis (01-23 @ 14:46)      RADIOLOGY/EKG:    < from: Xray Chest 1 View AP- PORTABLE-Urgent (01.23.18 @ 16:42) >    FINDINGS/  IMPRESSION:          There may be a tiny left pleural effusion. No focal consolidation.   Cardiomediastinal silhouette is intact.    < end of copied text >      < from: US Kidney and Bladder (01.25.18 @ 11:11) >    IMPRESSION: Lateral hydronephrosis with BILATERAL nephrolithiasis. RIGHT   hydroureter and calculi seen within the ureter. Recommend noncontrast CT   scan for complete evaluation.      < end of copied text >

## 2018-01-27 NOTE — PROGRESS NOTE ADULT - ASSESSMENT
#Toxic-Metabolic Encephalopathy -> Multifactorial due to  #Sepsis 2ndry to Complicated Proteus Mirabilis UTI   #Hematuria 2ndry to Nephrolithiasis/Obstruction  #Hyperkalemia (resolved) and Non AG Metabolic Acidosis 2ndry to Acute on Chronic Renal Failure    #Severe Dehydration – Pre-renal Azotemia  #nephrolithiasis  -Decrease IVF  -gong catheter inserted in the ED- +hematuria  -Urine Cx - Proteus Mirabilis - sensitive to cephalosporin  -cont. Ceftriaxone 1g IV daily#4  -Nephrology consult appreciated   -Urology consult appreciated  -hold all nephrotoxic agents (takes Enalapril, and Lasix)  -Kidney Sono- Lateral hydronephrosis with BILATERAL nephrolithiasis. RIGHT hydroureter and calculi seen within the ureter.  -Palliative care consult appreciated  -  pt. to be referred to [facility]. Pts family would also like to start comfort focus of plan here in the hospital (no blood draw, further testing, ect, just comfort care).    *Anemia of Blood Loss 2ndry to hematuria  -Started on Ferrous Sulfate     *CAD/HTN  -cont. Carvedilol 6.25mg po q12h  -hold Enalapril 5mg po daily and Lasix for now    *Hypothyroidism  -cont. Levothyroxine 50mcg po qAM    *Depression/Anxiety  -Amitriptyline 25mg po qHS    *Hyperlipidemia  -Simvastatin 20mg po qHS (Renal dosing is 5mg po qHS)    *Vte ppx  -cont. SCDs (pt w/ notable hematuria please reassess in the am)    *Code Status - HCP Chayo Mcintosh (daughter) [phone] and the patient’s current code status DNR/DNI, Comfort measures, do not send to hospital, No feeding tube, trial of IV fluids, determine use or limitation of antibiotics, No Dialysis. pt. to be referred to [facility], awaiting bed. Pts family would also like to start comfort focus of plan here in the hospital (no blood draw, further testing)     Case d/w team on IDR.

## 2018-01-27 NOTE — PROGRESS NOTE ADULT - SUBJECTIVE AND OBJECTIVE BOX
89 y/o F PMHx significant for HTN, Hypothyroidism, Dementia, anxiety, depression, and CKD3 who was BIBA from [facility] for further evaluation of decreased PO intake, increased lethargy over the past few days. Per Assisted living documentation the patient had signs of possible hematuria vs vaginal bleeding. The patient was unable to provide adequate hx due to her decreased mental status. In the ED the patient was found to have several lab abnormalities -> WBC 22.7, , K 6.9, BUN/Cr, 129/6.98, UA (+). In the ED the patient was given Ca gluconate 1g IV x 1, Reg Insulin 10units IV x 1, NaCO3 50mEq IV x 1, Kayexalate 30g po x 1, and D50 x 1amp, NS x 2L, and Ceftriaxone 1g IV x 1. In the ED Nephrology was consulted for further management. (23 Jan 2018 17:37). Palliative medicine consult for GOC discussion    1/24/18 Seen and examined at bedside with no family present. Pt awake and answers occasional questions with a word. Unable to formulate a sentence. Appears comfortable  1/25/18 Seen and examined at bedside with no family present. Pt appears lethargic with minimal response to verbal questioning. Denies pain or dyspnea  1/26/18 Seen and examined at bedside with no family present. Resting comfortably. Denies pain at this time however was moaning and showing signs of pain during the nihht. She received Morphine IVP  1/27/18 Seen and examined at bedside with no family present. RN Staff report that pt is having intermittent moaning and appears to be in pain. Medicated with Dilaudid with minimal relief     PAIN: (X )Yes   ( )No  Level: mod  RN noted signs of pain over night   DYSPNEA: ( ) Yes  (X ) No  Level:    PAST MEDICAL & SURGICAL HISTORY:  Dementia  Depression  Anxiety  HTN (hypertension)  H/O bilateral hip replacements      SOCIAL HX:    Lives in [facility] unit  Hx opiate tolerance ( )YES  ( X)NO    Baseline ADLs  (Prior to Admission)  ( ) Independent   (X )Dependent    FAMILY HISTORY:  No pertinent family history in first degree relatives      Review of Systems:    Anxiety-mod-severe as per daughter  Depression-  Physical Discomfort-  Dyspnea-none  Constipation-  Diarrhea-  Nausea-  Vomiting-  Anorexia-  Weight Loss-   Cough-  Secretions-  Fatigue-severe  Weakness-mod-severe  Delirium-    All other systems reviewed and negative  Unable to obtain/Limited due to: dementia      PHYSICAL EXAM:    ICU Vital Signs Last 24 Hrs  T(C): 36.7 (27 Jan 2018 05:00), Max: 37.3 (26 Jan 2018 21:04)  T(F): 98 (27 Jan 2018 05:00), Max: 99.1 (26 Jan 2018 21:04)  HR: 98 (27 Jan 2018 05:00) (94 - 98)  BP: 151/58 (27 Jan 2018 05:00) (129/69 - 151/58)  BP(mean): --  ABP: --  ABP(mean): --  RR: 19 (27 Jan 2018 05:00) (17 - 19)  SpO2: 96% (27 Jan 2018 05:00) (96% - 96%)      PPSV2:  20 %  FAST: 7C    General: Elderly female in bed in NAD  Mental Status: alert, minimally verbal  HEENT: oral mucosa dry  Lungs: clear to auscultation hannah  Cardiac: S1S2+  GI: abd soft mild tenderness RLQ upon deep palpation + BS  : Harmon cath draining hematuria  Ext: No edema  Neuro: dementia      LABS:                             9.8    15.7  )-----------( 419      ( 26 Jan 2018 07:33 )             30.1                        PTT - ( 23 Jan 2018 14:39 )  PTT:30.0 sec  Albumin: Albumin, Serum: 2.3 g/dL (01-24 @ 07:41)      Allergies    penicillin (Unknown)  tetanus toxoid (Unknown)      RADIOLOGY/ADDITIONAL STUDIES:  < from: US Kidney and Bladder (01.25.18 @ 11:11) >    EXAM:  US KIDNEYS AND BLADDER                            PROCEDURE DATE:  01/25/2018          INTERPRETATION:      Ultrasound of the kidneys         CLINICAL INFORMATION:       Hematuria    TECHNIQUE:  Transabdominal sonography was performed.    FINDINGS:   No comparison studies are available for review.    The right kidney measures 10.1 cm in length. Its contours are smooth.  No   focal lesion is found. There is moderate hydronephrosis with debris in   the collecting system. There is hydroureter with multiple stones noted in   the ureter measuring 1 cm, 8 mm and 1.2 cm.    . The left kidney measures 9.2 cm in length. Its contours are smooth.  No   focal lesion is found. There is moderate hydronephrosis containing debris   with multiple stones measuring 1.3 cm in the upper pole and 1.3 and 1 cm   in the lower pole.    The abdominal aorta measures normal caliber anterior to posterior.  The   IVC and retroperitoneal structures appear intact.      IMPRESSION: Lateral hydronephrosis with BILATERAL nephrolithiasis. RIGHT   hydroureter and calculi seen within the ureter. Recommend noncontrast CT   scan for complete evaluation.                    < from: Xray Chest 1 View AP- PORTABLE-Urgent (01.23.18 @ 16:42) >  EXAM:  XR CHEST PORTABLE URGENT 1V                            PROCEDURE DATE:  01/23/2018          INTERPRETATION:  Exam Date: 1/23/2018 4:42 PM    Chest radiograph (one view)         CLINICAL INFORMATION:  change in mental status    TECHNIQUE:  Single frontal view of the chest was obtained.    COMPARISON: June 29 2014    FINDINGS/  IMPRESSION:          There may be a tiny left pleural effusion. No focal consolidation.   Cardiomediastinal silhouette is intact.

## 2018-01-27 NOTE — CHART NOTE - NSCHARTNOTEFT_GEN_A_CORE
Follow Up Family Meeting Note:  Attendees: Pts daughter Chayo, son in law, Grandaugther Figueroa (via phone), Cole Barroso Palliative SW    This SW met with pts family to follow up. Pt. has been requiring IV medication for symptom management and is not taking PO. Pts family discussed they spoke with Dr. Padilla this morning and they have a clear understanding of pts condition and they would like to focus on pts comfort.     Inpatient hospice discussed and recommended. Team had also discussed this during our previous family meeting. Pts family is in agreement and would like pt. to be referred to [facility]. Pts family would also like to start comfort focus of plan here in the hospital (no blood draw, further testing, ect, just comfort care). Pt. is DNR/DNI and has Comfort MOLST.     Floor SW James made aware and reports there is no bed available at [facility] today. Referral will be sent. Pts family made aware that we are awaiting bed availability. Dr. Padilla also informed of plan. Emotional support provided. Our team will continue to follow.

## 2018-01-28 RX ADMIN — SODIUM CHLORIDE 60 MILLILITER(S): 9 INJECTION, SOLUTION INTRAVENOUS at 10:47

## 2018-01-28 RX ADMIN — HYDROMORPHONE HYDROCHLORIDE 0.5 MILLIGRAM(S): 2 INJECTION INTRAMUSCULAR; INTRAVENOUS; SUBCUTANEOUS at 07:45

## 2018-01-28 RX ADMIN — HYDROMORPHONE HYDROCHLORIDE 0.5 MILLIGRAM(S): 2 INJECTION INTRAMUSCULAR; INTRAVENOUS; SUBCUTANEOUS at 03:13

## 2018-01-28 NOTE — PROGRESS NOTE ADULT - ASSESSMENT
#Toxic-Metabolic Encephalopathy -> Multifactorial due to  #Sepsis 2ndry to Complicated Proteus Mirabilis UTI   #Hematuria 2ndry to Nephrolithiasis/Obstruction  #Hyperkalemia (resolved) and Non AG Metabolic Acidosis 2ndry to Acute on Chronic Renal Failure    #Severe Dehydration – Pre-renal Azotemia  #nephrolithiasis  -Decrease IVF  -gong catheter inserted in the ED- +hematuria  -Urine Cx - Proteus Mirabilis - sensitive to cephalosporin  -S/P Ceftriaxone 1g IV daily#5  -Nephrology consult appreciated   -Urology consult appreciated  -hold all nephrotoxic agents (takes Enalapril, and Lasix)  -Kidney Sono- Lateral hydronephrosis with BILATERAL nephrolithiasis. RIGHT hydroureter and calculi seen within the ureter.  -Palliative care consult appreciated  -  pt. to be referred to [facility]". Pts family would also like to start comfort focus of plan here in the hospital (no blood draw, further testing, ect, just comfort care).    *Anemia of Blood Loss 2ndry to hematuria  -Started on Ferrous Sulfate     *CAD/HTN  -cont. Carvedilol 6.25mg po q12h  -hold Enalapril 5mg po daily and Lasix for now    *Hypothyroidism  -cont. Levothyroxine 50mcg po qAM    *Depression/Anxiety  -Amitriptyline 25mg po qHS    *Hyperlipidemia  -Simvastatin 20mg po qHS (Renal dosing is 5mg po qHS)    *Vte ppx  -cont. SCDs (pt w/ notable hematuria)    *Code Status - HCP Chayo Mcintosh (daughter) [phone] and the patient’s current code status DNR/DNI, Comfort measures, do not send to hospital, No feeding tube, trial of IV fluids, determine use or limitation of antibiotics, No Dialysis. pt. to be referred to \[facility]", awaiting bed. Pts family would also like to start comfort focus of plan here in the hospital (no blood draw, further testing)     *Diposition - Awaiting bed at \[facility]\""

## 2018-01-28 NOTE — PROGRESS NOTE ADULT - ASSESSMENT
89 y/o F PMHx significant for HTN, Hypothyroidism, Dementia, anxiety, depression, and CKD3 who was BIBA from [facility] for further evaluation of decreased PO intake, increased lethargy over the past few days. Per Assisted living documentation the patient had signs of possible hematuria vs vaginal bleeding. The patient was unable to provide adequate hx due to her decreased mental status. In the ED the patient was found to have several lab abnormalities -> WBC 22.7, , K 6.9, BUN/Cr, 129/6.98, UA (+). In the ED the patient was given Ca gluconate 1g IV x 1, Reg Insulin 10units IV x 1, NaCO3 50mEq IV x 1, Kayexalate 30g po x 1, and D50 x 1amp, NS x 2L, and Ceftriaxone 1g IV x 1. In the ED Nephrology was consulted for further management. (23 Jan 2018 17:37). Palliative medicine consult for GOC discussion    Assessment and Plan:    1) Sepsis  -UTI  -Leukocytosis  -AMS  -Cont abx as per medicine  2) UTI  -UA noted  -Harmon cath in place draining hematuria  -abx as ordered  3) Altered Mental Status  -? baseline  -FAST 7C  -R/t inf process  -R/T encephalopathy  -supportive care  4) Pain  -Cont Ativan 0.5 mg IVP Q6H PRN agitation  -Cont Dilaudid to 0.5 mg IVP Q2H PRN mod pain  5) OS on CKD  -Creat> 5  -Hyperkalemia  -Nephrology eval noted  -No HD indicated at present  -US kidneys =  Lateral hydronephrosis with BILATERAL nephrolithiasis. RIGHT   hydroureter and calculi seen within the ureter. Recommend noncontrast CT   scan for complete evaluation.  -Urology eval noted  -No intervention indicated  6) Advanced Directives  -Pt without capacity  -Daughter Chayo Mcintosh surrogate  -DNR/DNI on chart  -Pall SW met with Chayo and her   1/27 for GOC discussion   -She would like to focus on comfort measures only and transition to inpatient hospice services at [facility] when a bed is available  -SW aware. Referral placed

## 2018-01-28 NOTE — PROGRESS NOTE ADULT - SUBJECTIVE AND OBJECTIVE BOX
HOSPITALIST PROGRESS NOTE:  SUBJECTIVE:  PCP: PCP; Dr. Parikh    Chief Complaint: Patient is a 89y old  Female who presents with a chief complaint of AMS (23 Jan 2018 17:37)      HPI:  89 y/o F PMHx significant for HTN, Hypothyroidism, Dementia, anxiety, depression, and CKD3 who was BIBA from [facility] for further evaluation of decreased PO intake, increased lethargy over the past few days. Per Assisted living documentation the patient had signs of possible hematuria vs vaginal bleeding. The patient was unable to provide adequate hx due to her decreased mental status. In the ED the patient was found to have several lab abnormalities -> WBC 22.7, , K 6.9, BUN/Cr, 129/6.98, UA (+). In the ED the patient was given Ca gluconate 1g IV x 1, Reg Insulin 10units IV x 1, NaCO3 50mEq IV x 1, Kayexalate 30g po x 1, and D50 x 1amp, NS x 2L, and Ceftriaxone 1g IV x 1. In the ED Nephrology was consulted for further management. (23 Jan 2018 17:37)    1/24:  Patient confused; Gong placed in er and Draining Gross hematuria and Pus.   1/25: Still having gross hematuria via gong cath. Seen by urology.   1/26- Awake- does not follow commands. No overnight events, no new complaints.   1/27- Awake, open eyes when name is called. Family at bedside. Discussed lab results   1/28 - family at bedside; Awake. No overnight events    Allergies:  penicillin (Unknown)  tetanus toxoid (Unknown)    REVIEW OF SYSTEMS:  See HPI. All other review of systems is negative unless indicated above.     Vital Signs Last 24 Hrs  T(C): 36.9 (28 Jan 2018 12:11), Max: 37.2 (28 Jan 2018 05:51)  T(F): 98.4 (28 Jan 2018 12:11), Max: 98.9 (28 Jan 2018 05:51)  HR: 99 (28 Jan 2018 12:11) (99 - 102)  BP: 136/56 (28 Jan 2018 12:11) (136/56 - 156/58)  BP(mean): --  RR: 18 (28 Jan 2018 12:11) (18 - 18)  SpO2: 96% (28 Jan 2018 12:11) (94% - 96%)    Constitutional: NAD, awake and alert, well-developed  Neurological: confused, no focal deficits  HEENT: PERRLA, EOMI, MMM  Neck: Soft and supple, No LAD, No JVD  Respiratory: Breath sounds are clear bilaterally, No wheezing, rales or rhonchi  Cardiovascular: S1 and S2, regular rate and rhythm; no Murmurs, gallops or rubs  Gastrointestinal: Bowel Sounds present, soft, nontender, nondistended, no guarding, no rebound tenderness  Back: No CVA tenderness; + gong with Gross hematuria and puslike material  Extremities: No peripheral edema  Vascular: 2+ peripheral pulses  Musculoskeletal: 5/5 strength b/l upper and lower extremities  Skin: No rashes  Breast: Deferred  Rectal: Deferred    MEDICATIONS  (STANDING):  acetaminophen  IVPB. 1000 milliGRAM(s) IV Intermittent once  carvedilol 6.25 milliGRAM(s) Oral every 12 hours  cefTRIAXone Injectable 1000 milliGRAM(s) IV Push every 24 hours  levothyroxine 50 MICROGram(s) Oral daily  simvastatin 20 milliGRAM(s) Oral at bedtime  sodium bicarbonate  Infusion 0.165 mEq/kG/Hr (75 mL/Hr) IV Continuous <Continuous>    Lab Results:  CBC  CBC Full  -  ( 26 Jan 2018 07:33 )  WBC Count : 15.7 K/uL  Hemoglobin : 9.8 g/dL  Hematocrit : 30.1 %  Platelet Count - Automated : 419 K/uL  Mean Cell Volume : 91.8 fl  Mean Cell Hemoglobin : 30.0 pg  Mean Cell Hemoglobin Concentration : 32.6 gm/dL  Auto Neutrophil # : x  Auto Lymphocyte # : x  Auto Monocyte # : x  Auto Eosinophil # : x  Auto Basophil # : x  Auto Neutrophil % : x  Auto Lymphocyte % : x  Auto Monocyte % : x  Auto Eosinophil % : x  Auto Basophil % : x    .		Differential:	[] Automated		[] Manual  Chemistry                        9.8    15.7  )-----------( 419      ( 26 Jan 2018 07:33 )             30.1     01-26    143  |  111<H>  |  75<H>  ----------------------------<  100<H>  4.2   |  20<L>  |  3.87<H>    Ca    8.5      26 Jan 2018 07:33    MICROBIOLOGY/CULTURES:  Culture Results:   >100,000 CFU/ml Proteus mirabilis (01-23 @ 14:46)      RADIOLOGY/EKG:    < from: Xray Chest 1 View AP- PORTABLE-Urgent (01.23.18 @ 16:42) >    FINDINGS/  IMPRESSION:          There may be a tiny left pleural effusion. No focal consolidation.   Cardiomediastinal silhouette is intact.    < end of copied text >      < from: US Kidney and Bladder (01.25.18 @ 11:11) >    IMPRESSION: Lateral hydronephrosis with BILATERAL nephrolithiasis. RIGHT   hydroureter and calculi seen within the ureter. Recommend noncontrast CT   scan for complete evaluation.      < end of copied text >

## 2018-01-28 NOTE — PROGRESS NOTE ADULT - SUBJECTIVE AND OBJECTIVE BOX
89 y/o F PMHx significant for HTN, Hypothyroidism, Dementia, anxiety, depression, and CKD3 who was BIBA from [facility] for further evaluation of decreased PO intake, increased lethargy over the past few days. Per Assisted living documentation the patient had signs of possible hematuria vs vaginal bleeding. The patient was unable to provide adequate hx due to her decreased mental status. In the ED the patient was found to have several lab abnormalities -> WBC 22.7, , K 6.9, BUN/Cr, 129/6.98, UA (+). In the ED the patient was given Ca gluconate 1g IV x 1, Reg Insulin 10units IV x 1, NaCO3 50mEq IV x 1, Kayexalate 30g po x 1, and D50 x 1amp, NS x 2L, and Ceftriaxone 1g IV x 1. In the ED Nephrology was consulted for further management. (23 Jan 2018 17:37). Palliative medicine consult for GOC discussion    1/24/18 Seen and examined at bedside with no family present. Pt awake and answers occasional questions with a word. Unable to formulate a sentence. Appears comfortable  1/25/18 Seen and examined at bedside with no family present. Pt appears lethargic with minimal response to verbal questioning. Denies pain or dyspnea  1/26/18 Seen and examined at bedside with no family present. Resting comfortably. Denies pain at this time however was moaning and showing signs of pain during the nihht. She received Morphine IVP  1/27/18 Seen and examined at bedside with no family present. RN Staff report that pt is having intermittent moaning and appears to be in pain. Medicated with Dilaudid with minimal relief  1/28/18 Seen and examined at bedside. Appears awake and alert . Denies pain. Non verbal. Refusing all PO   PAIN: (X )Yes   ( )No  Level: mod  RN noted signs of pain over night   DYSPNEA: ( ) Yes  (X ) No  Level:    PAST MEDICAL & SURGICAL HISTORY:  Dementia  Depression  Anxiety  HTN (hypertension)  H/O bilateral hip replacements      SOCIAL HX:    Lives in [facility] unit  Hx opiate tolerance ( )YES  ( X)NO    Baseline ADLs  (Prior to Admission)  ( ) Independent   (X )Dependent    FAMILY HISTORY:  No pertinent family history in first degree relatives      Review of Systems:    Anxiety-mod-severe as per daughter  Depression-  Physical Discomfort-  Dyspnea-none  Constipation-  Diarrhea-  Nausea-  Vomiting-  Anorexia-  Weight Loss-   Cough-  Secretions-  Fatigue-severe  Weakness-mod-severe  Delirium-    All other systems reviewed and negative  Unable to obtain/Limited due to: dementia      PHYSICAL EXAM:    ICU Vital Signs Last 24 Hrs  T(C): 37.2 (28 Jan 2018 05:51), Max: 37.2 (28 Jan 2018 05:51)  T(F): 98.9 (28 Jan 2018 05:51), Max: 98.9 (28 Jan 2018 05:51)  HR: 102 (28 Jan 2018 05:51) (95 - 102)  BP: 156/58 (28 Jan 2018 05:51) (128/59 - 156/58)  BP(mean): --  ABP: --  ABP(mean): --  RR: 18 (27 Jan 2018 12:24) (18 - 18)  SpO2: 94% (28 Jan 2018 05:51) (94% - 96%)      PPSV2:  20 %  FAST: 7C    General: Elderly female in bed in NAD  Mental Status: alert, non verbal  HEENT: oral mucosa dry  Lungs: clear to auscultation hannah  Cardiac: S1S2+  GI: abd soft mild tenderness RLQ upon deep palpation + BS  : Harmon cath draining hematuria  Ext: No edema  Neuro: dementia      LABS:                             9.8    15.7  )-----------( 419      ( 26 Jan 2018 07:33 )             30.1                        PTT - ( 23 Jan 2018 14:39 )  PTT:30.0 sec  Albumin: Albumin, Serum: 2.3 g/dL (01-24 @ 07:41)      Allergies    penicillin (Unknown)  tetanus toxoid (Unknown)      RADIOLOGY/ADDITIONAL STUDIES:  < from: US Kidney and Bladder (01.25.18 @ 11:11) >    EXAM:  US KIDNEYS AND BLADDER                            PROCEDURE DATE:  01/25/2018          INTERPRETATION:      Ultrasound of the kidneys         CLINICAL INFORMATION:       Hematuria    TECHNIQUE:  Transabdominal sonography was performed.    FINDINGS:   No comparison studies are available for review.    The right kidney measures 10.1 cm in length. Its contours are smooth.  No   focal lesion is found. There is moderate hydronephrosis with debris in   the collecting system. There is hydroureter with multiple stones noted in   the ureter measuring 1 cm, 8 mm and 1.2 cm.    . The left kidney measures 9.2 cm in length. Its contours are smooth.  No   focal lesion is found. There is moderate hydronephrosis containing debris   with multiple stones measuring 1.3 cm in the upper pole and 1.3 and 1 cm   in the lower pole.    The abdominal aorta measures normal caliber anterior to posterior.  The   IVC and retroperitoneal structures appear intact.      IMPRESSION: Lateral hydronephrosis with BILATERAL nephrolithiasis. RIGHT   hydroureter and calculi seen within the ureter. Recommend noncontrast CT   scan for complete evaluation.                    < from: Xray Chest 1 View AP- PORTABLE-Urgent (01.23.18 @ 16:42) >  EXAM:  XR CHEST PORTABLE URGENT 1V                            PROCEDURE DATE:  01/23/2018          INTERPRETATION:  Exam Date: 1/23/2018 4:42 PM    Chest radiograph (one view)         CLINICAL INFORMATION:  change in mental status    TECHNIQUE:  Single frontal view of the chest was obtained.    COMPARISON: June 29 2014    FINDINGS/  IMPRESSION:          There may be a tiny left pleural effusion. No focal consolidation.   Cardiomediastinal silhouette is intact.

## 2018-01-28 NOTE — PROGRESS NOTE ADULT - ATTENDING COMMENTS
Patient was seen and examined by me at bedside with the NP, Mikayla Mcwilliams.  Plan of care was reviewed and Agree with the assessment and plan.

## 2018-01-29 ENCOUNTER — TRANSCRIPTION ENCOUNTER (OUTPATIENT)
Age: 83
End: 2018-01-29

## 2018-01-29 VITALS
DIASTOLIC BLOOD PRESSURE: 68 MMHG | OXYGEN SATURATION: 97 % | SYSTOLIC BLOOD PRESSURE: 150 MMHG | RESPIRATION RATE: 17 BRPM | HEART RATE: 106 BPM

## 2018-01-29 RX ORDER — LEVOTHYROXINE SODIUM 125 MCG
1 TABLET ORAL
Qty: 0 | Refills: 0 | COMMUNITY
Start: 2018-01-29

## 2018-01-29 RX ORDER — CARVEDILOL PHOSPHATE 80 MG/1
1 CAPSULE, EXTENDED RELEASE ORAL
Qty: 0 | Refills: 0 | COMMUNITY
Start: 2018-01-29

## 2018-01-29 RX ORDER — SIMVASTATIN 20 MG/1
1 TABLET, FILM COATED ORAL
Qty: 0 | Refills: 0 | COMMUNITY

## 2018-01-29 RX ORDER — HYDROMORPHONE HYDROCHLORIDE 2 MG/ML
0.5 INJECTION INTRAMUSCULAR; INTRAVENOUS; SUBCUTANEOUS
Qty: 0 | Refills: 0 | COMMUNITY
Start: 2018-01-29

## 2018-01-29 RX ORDER — AMITRIPTYLINE HCL 25 MG
3 TABLET ORAL
Qty: 0 | Refills: 0 | COMMUNITY

## 2018-01-29 RX ORDER — CHOLECALCIFEROL (VITAMIN D3) 125 MCG
1 CAPSULE ORAL
Qty: 0 | Refills: 0 | COMMUNITY

## 2018-01-29 RX ORDER — ONDANSETRON 8 MG/1
2 TABLET, FILM COATED ORAL
Qty: 0 | Refills: 0 | COMMUNITY
Start: 2018-01-29

## 2018-01-29 RX ORDER — CEFTRIAXONE 500 MG/1
1000 INJECTION, POWDER, FOR SOLUTION INTRAMUSCULAR; INTRAVENOUS EVERY 24 HOURS
Qty: 0 | Refills: 0 | Status: COMPLETED | OUTPATIENT
Start: 2018-01-29 | End: 2018-01-29

## 2018-01-29 RX ADMIN — HYDROMORPHONE HYDROCHLORIDE 0.5 MILLIGRAM(S): 2 INJECTION INTRAMUSCULAR; INTRAVENOUS; SUBCUTANEOUS at 11:26

## 2018-01-29 RX ADMIN — HYDROMORPHONE HYDROCHLORIDE 0.5 MILLIGRAM(S): 2 INJECTION INTRAMUSCULAR; INTRAVENOUS; SUBCUTANEOUS at 14:00

## 2018-01-29 RX ADMIN — SODIUM CHLORIDE 60 MILLILITER(S): 9 INJECTION, SOLUTION INTRAVENOUS at 07:03

## 2018-01-29 RX ADMIN — CEFTRIAXONE 1000 MILLIGRAM(S): 500 INJECTION, POWDER, FOR SOLUTION INTRAMUSCULAR; INTRAVENOUS at 13:57

## 2018-01-29 NOTE — PROGRESS NOTE ADULT - SUBJECTIVE AND OBJECTIVE BOX
HOSPITALIST PROGRESS NOTE:  SUBJECTIVE:  PCP: Dr. Parikh    Chief Complaint: Patient is a 89y old  Female who presents with a chief complaint of AMS (23 Jan 2018 17:37)    HPI:  89 y/o F PMHx significant for HTN, Hypothyroidism, Dementia, anxiety, depression, and CKD3 who was BIBA from [facility] for further evaluation of decreased PO intake, increased lethargy over the past few days. Per Assisted living documentation the patient had signs of possible hematuria vs vaginal bleeding. The patient was unable to provide adequate hx due to her decreased mental status. In the ED the patient was found to have several lab abnormalities -> WBC 22.7, , K 6.9, BUN/Cr, 129/6.98, UA (+). In the ED the patient was given Ca gluconate 1g IV x 1, Reg Insulin 10units IV x 1, NaCO3 50mEq IV x 1, Kayexalate 30g po x 1, and D50 x 1amp, NS x 2L, and Ceftriaxone 1g IV x 1. In the ED Nephrology was consulted for further management. (23 Jan 2018 17:37)    1/24:  Patient confused; Gong placed in er and Draining Gross hematuria and Pus.   1/25: Still having gross hematuria via gong cath. Seen by urology.   1/26- Awake- does not follow commands. No overnight events, no new complaints.   1/27- Awake, open eyes when name is called. Family at bedside. Discussed lab results   1/28 - family at bedside; Awake. No overnight events  1/29 - Awake, no new complaints.     Allergies:  penicillin (Unknown)  tetanus toxoid (Unknown)    REVIEW OF SYSTEMS:  See HPI. All other review of systems is negative unless indicated above.     ICU Vital Signs Last 24 Hrs  T(C): 36.3 (28 Jan 2018 21:18), Max: 36.3 (28 Jan 2018 21:18)  T(F): 97.4 (28 Jan 2018 21:18), Max: 97.4 (28 Jan 2018 21:18)  HR: 106 (29 Jan 2018 05:10) (105 - 106)  BP: 150/68 (29 Jan 2018 05:10) (138/70 - 150/68)  BP(mean): --  ABP: --  ABP(mean): --  RR: 17 (29 Jan 2018 05:10) (17 - 18)  SpO2: 97% (29 Jan 2018 05:10) (97% - 100%)    GEN: lying in bed, NAD  HEENT:   NC/AT, pupils equal and reactive, EOMI  CV:  +S1, +S2, RRR  RESP:   lungs clear to auscultation bilaterally, no wheeze, rales, rhonchi   BREAST:  not examined  GI:  abdomen soft, non-tender, non-distended, normoactive BS  RECTAL:  not examined  : not examined. +gong  MSK:   normal muscle tone  EXT:  no edema  NEURO:  awake, confused, no focal neurological deficits  SKIN:  no rashes    MEDICATIONS  (STANDING):  acetaminophen  IVPB. 1000 milliGRAM(s) IV Intermittent once  carvedilol 6.25 milliGRAM(s) Oral every 12 hours  cefTRIAXone Injectable 1000 milliGRAM(s) IV Push every 24 hours  levothyroxine 50 MICROGram(s) Oral daily  simvastatin 20 milliGRAM(s) Oral at bedtime  sodium bicarbonate  Infusion 0.165 mEq/kG/Hr (75 mL/Hr) IV Continuous <Continuous>    Labs reviewed.     RADIOLOGY/EKG:    < from: Xray Chest 1 View AP- PORTABLE-Urgent (01.23.18 @ 16:42) >    FINDINGS/  IMPRESSION:          There may be a tiny left pleural effusion. No focal consolidation.   Cardiomediastinal silhouette is intact.    < end of copied text >      < from: US Kidney and Bladder (01.25.18 @ 11:11) >    IMPRESSION: Lateral hydronephrosis with BILATERAL nephrolithiasis. RIGHT   hydroureter and calculi seen within the ureter. Recommend noncontrast CT   scan for complete evaluation.      < end of copied text >

## 2018-01-29 NOTE — DISCHARGE NOTE ADULT - PLAN OF CARE
clear infection completed full course of antibiotics avoid further damage [facility], [facility] [place]

## 2018-01-29 NOTE — DISCHARGE NOTE ADULT - HOSPITAL COURSE
89 y/o F PMHx significant for HTN, Hypothyroidism, Dementia, anxiety, depression, and CKD3 who was BIBA from [facility] for further evaluation of decreased PO intake, increased lethargy over the past few days. Per Assisted living documentation the patient had signs of possible hematuria vs vaginal bleeding. The patient was unable to provide adequate hx due to her decreased mental status. In the ED the patient was found to have several lab abnormalities -> WBC 22.7, , K 6.9, BUN/Cr, 129/6.98, UA (+). In the ED the patient was given Ca gluconate 1g IV x 1, Reg Insulin 10units IV x 1, NaCO3 50mEq IV x 1, Kayexalate 30g po x 1, and D50 x 1amp, NS x 2L, and Ceftriaxone 1g IV x 1. In the ED Nephrology was consulted for further management. (23 Jan 2018 17:37) pt contineud to detriorate with poor po intake and pt an family met with palliative and plan for inpatient hospice.  MOLS completed    ICU Vital Signs Last 24 Hrs  T(C): 36.3 (28 Jan 2018 21:18), Max: 36.3 (28 Jan 2018 21:18)  T(F): 97.4 (28 Jan 2018 21:18), Max: 97.4 (28 Jan 2018 21:18)  HR: 106 (29 Jan 2018 05:10) (105 - 106)  BP: 150/68 (29 Jan 2018 05:10) (138/70 - 150/68)  BP(mean): --  ABP: --  ABP(mean): --  RR: 17 (29 Jan 2018 05:10) (17 - 18)  SpO2: 97% (29 Jan 2018 05:10) (97% - 100%)  ROS- nonverbal  GEN:lethargic  CV:  +S1, +S2, RRR  RESP:   scattered rhonchi  BREAST:  not examined  GI:  abdomen soft, non-tender, non-distended, normoactive BS  RECTAL:  not examined  :  not examined  MSK:   normal muscle tone  EXT:  no edema  NEURO: somnolent  SKIN:  no rashes    labs reviewed  #Lateral hydronephrosis with BILATERAL nephrolithiasis. RIGHT   hydroureter and calculi seen within the ureter - no further workup - hospice  #Toxic-Metabolic Encephalopathy -> Multifactorial due to  #Sepsis 2ndry to Complicated Proteus Mirabilis UTI   #Hematuria 2ndry to Nephrolithiasis/Obstruction  #Hyperkalemia (resolved) and Non AG Metabolic Acidosis 2ndry to Acute on Chronic Renal Failure    #Severe Dehydration    total time spent 40 mins

## 2018-01-29 NOTE — PROGRESS NOTE ADULT - ASSESSMENT
89 y/o F PMHx significant for HTN, Hypothyroidism, Dementia, anxiety, depression, and CKD3 who was BIBA from [facility] for further evaluation of decreased PO intake, increased lethargy over the past few days. Per Assisted living documentation the patient had signs of possible hematuria vs vaginal bleeding. The patient was unable to provide adequate hx due to her decreased mental status. In the ED the patient was found to have several lab abnormalities -> WBC 22.7, , K 6.9, BUN/Cr, 129/6.98, UA (+). In the ED the patient was given Ca gluconate 1g IV x 1, Reg Insulin 10units IV x 1, NaCO3 50mEq IV x 1, Kayexalate 30g po x 1, and D50 x 1amp, NS x 2L, and Ceftriaxone 1g IV x 1. In the ED Nephrology was consulted for further management. (23 Jan 2018 17:37). Palliative medicine consult for GOC discussion    Assessment and Plan:    1) Sepsis  -UTI  -Leukocytosis  -AMS  -Cont abx as per medicine  2) UTI  -UA noted  -Harmon cath in place draining hematuria  -abx as ordered  3) Altered Mental Status  -? baseline  -FAST 7C  -R/t inf process  -R/T encephalopathy  -supportive care  4) Pain  -Cont Ativan 0.5 mg IVP Q6H PRN agitation  -Cont Dilaudid to 0.5 mg IVP Q2H PRN mod pain  5) SO on CKD  -Creat> 5  -Hyperkalemia  -Nephrology eval noted  -No HD indicated at present  -US kidneys =  Lateral hydronephrosis with BILATERAL nephrolithiasis. RIGHT   hydroureter and calculi seen within the ureter. Recommend noncontrast CT   scan for complete evaluation.  -Urology eval noted  -No intervention indicated  6) Advanced Directives  -Pt without capacity  -Daughter Chayo Mcintosh surrogate  -DNR/DNI on chart  -Pall SW met with Chayo and her   1/27 for GOC discussion   -She would like to focus on comfort measures only and transition to inpatient hospice services at [facility] when a bed is available  -SW aware. Referral placed

## 2018-01-29 NOTE — DISCHARGE NOTE ADULT - CARE PLAN
Principal Discharge DX:	Sepsis secondary to UTI  Goal:	clear infection  Assessment and plan of treatment:	completed full course of antibiotics  Secondary Diagnosis:	Acute renal failure, unspecified acute renal failure type  Goal:	avoid further damage  Assessment and plan of treatment:	[facility]

## 2018-01-29 NOTE — PROGRESS NOTE ADULT - SUBJECTIVE AND OBJECTIVE BOX
89 y/o F PMHx significant for HTN, Hypothyroidism, Dementia, anxiety, depression, and CKD3 who was BIBA from [facility] for further evaluation of decreased PO intake, increased lethargy over the past few days. Per Assisted living documentation the patient had signs of possible hematuria vs vaginal bleeding. The patient was unable to provide adequate hx due to her decreased mental status. In the ED the patient was found to have several lab abnormalities -> WBC 22.7, , K 6.9, BUN/Cr, 129/6.98, UA (+). In the ED the patient was given Ca gluconate 1g IV x 1, Reg Insulin 10units IV x 1, NaCO3 50mEq IV x 1, Kayexalate 30g po x 1, and D50 x 1amp, NS x 2L, and Ceftriaxone 1g IV x 1. In the ED Nephrology was consulted for further management. (23 Jan 2018 17:37). Palliative medicine consult for GOC discussion    1/24/18 Seen and examined at bedside with no family present. Pt awake and answers occasional questions with a word. Unable to formulate a sentence. Appears comfortable  1/25/18 Seen and examined at bedside with no family present. Pt appears lethargic with minimal response to verbal questioning. Denies pain or dyspnea  1/26/18 Seen and examined at bedside with no family present. Resting comfortably. Denies pain at this time however was moaning and showing signs of pain during the nihht. She received Morphine IVP  1/27/18 Seen and examined at bedside with no family present. RN Staff report that pt is having intermittent moaning and appears to be in pain. Medicated with Dilaudid with minimal relief  1/28/18 Seen and examined at bedside. Appears awake and alert . Denies pain. Non verbal. Refusing all PO   1/29/18 Seen and examined at bedside. Awake, nonverbal. appears comfortable   PAIN: (X )Yes   ( )No  Level: mod  RN noted signs of pain over night   -Medicated X 2 IV Dilaudid  DYSPNEA: ( ) Yes  (X ) No  Level:    PAST MEDICAL & SURGICAL HISTORY:  Dementia  Depression  Anxiety  HTN (hypertension)  H/O bilateral hip replacements      SOCIAL HX:    Lives in [facility] unit  Hx opiate tolerance ( )YES  ( X)NO    Baseline ADLs  (Prior to Admission)  ( ) Independent   (X )Dependent    FAMILY HISTORY:  No pertinent family history in first degree relatives      Review of Systems:    Anxiety-mod-severe as per daughter  Depression-  Physical Discomfort-  Dyspnea-none  Constipation-  Diarrhea-  Nausea-  Vomiting-  Anorexia-  Weight Loss-   Cough-  Secretions-  Fatigue-severe  Weakness-mod-severe  Delirium-    All other systems reviewed and negative  Unable to obtain/Limited due to: dementia      PHYSICAL EXAM:      ICU Vital Signs Last 24 Hrs  T(C): 36.3 (28 Jan 2018 21:18), Max: 36.9 (28 Jan 2018 12:11)  T(F): 97.4 (28 Jan 2018 21:18), Max: 98.4 (28 Jan 2018 12:11)  HR: 106 (29 Jan 2018 05:10) (99 - 106)  BP: 150/68 (29 Jan 2018 05:10) (136/56 - 150/68)  BP(mean): --  ABP: --  ABP(mean): --  RR: 17 (29 Jan 2018 05:10) (17 - 18)  SpO2: 97% (29 Jan 2018 05:10) (96% - 100%)    PPSV2:  20 %  FAST: 7C    General: Elderly female in bed in NAD  Mental Status: alert, non verbal  HEENT: oral mucosa dry  Lungs: clear to auscultation hannah  Cardiac: S1S2+  GI: abd soft mild tenderness RLQ upon deep palpation + BS  : Harmon cath draining hematuria  Ext: No edema  Neuro: dementia      LABS:                             9.8    15.7  )-----------( 419      ( 26 Jan 2018 07:33 )             30.1                        PTT - ( 23 Jan 2018 14:39 )  PTT:30.0 sec  Albumin: Albumin, Serum: 2.3 g/dL (01-24 @ 07:41)      Allergies    penicillin (Unknown)  tetanus toxoid (Unknown)      RADIOLOGY/ADDITIONAL STUDIES:  < from: US Kidney and Bladder (01.25.18 @ 11:11) >    EXAM:  US KIDNEYS AND BLADDER                            PROCEDURE DATE:  01/25/2018          INTERPRETATION:      Ultrasound of the kidneys         CLINICAL INFORMATION:       Hematuria    TECHNIQUE:  Transabdominal sonography was performed.    FINDINGS:   No comparison studies are available for review.    The right kidney measures 10.1 cm in length. Its contours are smooth.  No   focal lesion is found. There is moderate hydronephrosis with debris in   the collecting system. There is hydroureter with multiple stones noted in   the ureter measuring 1 cm, 8 mm and 1.2 cm.    . The left kidney measures 9.2 cm in length. Its contours are smooth.  No   focal lesion is found. There is moderate hydronephrosis containing debris   with multiple stones measuring 1.3 cm in the upper pole and 1.3 and 1 cm   in the lower pole.    The abdominal aorta measures normal caliber anterior to posterior.  The   IVC and retroperitoneal structures appear intact.      IMPRESSION: Lateral hydronephrosis with BILATERAL nephrolithiasis. RIGHT   hydroureter and calculi seen within the ureter. Recommend noncontrast CT   scan for complete evaluation.                    < from: Xray Chest 1 View AP- PORTABLE-Urgent (01.23.18 @ 16:42) >  EXAM:  XR CHEST PORTABLE URGENT 1V                            PROCEDURE DATE:  01/23/2018          INTERPRETATION:  Exam Date: 1/23/2018 4:42 PM    Chest radiograph (one view)         CLINICAL INFORMATION:  change in mental status    TECHNIQUE:  Single frontal view of the chest was obtained.    COMPARISON: June 29 2014    FINDINGS/  IMPRESSION:          There may be a tiny left pleural effusion. No focal consolidation.   Cardiomediastinal silhouette is intact.

## 2018-01-29 NOTE — PROGRESS NOTE ADULT - PROVIDER SPECIALTY LIST ADULT
Hospitalist
Nephrology
Palliative Care
Hospitalist

## 2018-01-29 NOTE — PROGRESS NOTE ADULT - NSHPATTENDINGPLANDISCUSS_GEN_ALL_CORE
Dr Baron and team
patient, nurse

## 2018-01-29 NOTE — DISCHARGE NOTE ADULT - MEDICATION SUMMARY - MEDICATIONS TO TAKE
I will START or STAY ON the medications listed below when I get home from the hospital:    HYDROmorphone  -- 0.5 milligram(s) intravenous every 3 hours, As Needed for pain   -- Indication: For pain    LORazepam  -- 0.5 milligram(s) intravenous every 6 hours, As Needed for anxiety or agitation   -- Indication: For Anxiety    ondansetron 2 mg/mL injectable solution  -- 2 milligram(s) injectable every 4 hours, As Needed for nausea   -- Indication: For nausea    carvedilol 6.25 mg oral tablet  -- 1 tab(s) by mouth every 12 hours  -- Indication: For Heart rate    levothyroxine 50 mcg (0.05 mg) oral tablet  -- 1 tab(s) by mouth once a day  -- Indication: For thyroid

## 2018-01-29 NOTE — PROGRESS NOTE ADULT - ASSESSMENT
#Toxic-Metabolic Encephalopathy -> Multifactorial due to  #Sepsis 2ndry to Complicated Proteus Mirabilis UTI   #Hematuria 2ndry to Nephrolithiasis/Obstruction  #Hyperkalemia (resolved) and Non AG Metabolic Acidosis 2ndry to Acute on Chronic Renal Failure    #Severe Dehydration – Pre-renal Azotemia  #nephrolithiasis  -Decrease IVF  -gong catheter inserted in the ED- +hematuria  -Urine Cx - Proteus Mirabilis - sensitive to cephalosporin  -S/P Ceftriaxone 1g IV daily#5  -Nephrology consult appreciated   -Urology consult appreciated  -hold all nephrotoxic agents (takes Enalapril, and Lasix)  -Kidney Sono- Lateral hydronephrosis with BILATERAL nephrolithiasis. RIGHT hydroureter and calculi seen within the ureter.  -Palliative care consult appreciated  - pt. to be referred to \[facility]\"". Pts family would also like to start comfort focus of plan here in the hospital (no blood draw, further testing, ect, just comfort care).    *Anemia of Blood Loss 2ndry to hematuria  -Started on Ferrous Sulfate   -monitor    *CAD/HTN  -cont. Carvedilol 6.25mg po q12h  -hold Enalapril 5mg po daily and Lasix for now    *Hypothyroidism  -cont. Levothyroxine 50mcg po qAM    *Depression/Anxiety  -Amitriptyline 25mg po qHS    *Hyperlipidemia  -Simvastatin 20mg po qHS (Renal dosing is 5mg po qHS)    *Vte ppx  -cont. SCDs (pt w/ notable hematuria)    *Code Status - HCP Chayo Mcintosh (daughter) [phone] and the patient’s current code status DNR/DNI, Comfort measures, do not send to hospital, No feeding tube, trial of IV fluids, determine use or limitation of antibiotics, No Dialysis. pt. to be referred to \[facility]\"", awaiting bed. Pts family would also like to start comfort focus of plan here in the hospital (no blood draw, further testing)     *Diposition - Awaiting bed at \[facility]\""

## 2018-01-29 NOTE — DISCHARGE NOTE ADULT - MEDICATION SUMMARY - MEDICATIONS TO STOP TAKING
I will STOP taking the medications listed below when I get home from the hospital:    oscal  --    Lasix 20 mg oral tablet  -- 1 tab(s) by mouth once a day    Men-phor 0.5%-0.5% topical lotion  -- Apply on skin to affected area 3 times a day    Vitamin D3 1000 intl units oral tablet  -- 1 tab(s) by mouth once a day    enalapril 5 mg oral tablet  -- 1 tab(s) by mouth once a day    simvastatin 20 mg oral tablet  -- 1 tab(s) by mouth once a day (at bedtime)

## 2018-02-05 DIAGNOSIS — F32.9 MAJOR DEPRESSIVE DISORDER, SINGLE EPISODE, UNSPECIFIED: ICD-10-CM

## 2018-02-05 DIAGNOSIS — F03.90 UNSPECIFIED DEMENTIA WITHOUT BEHAVIORAL DISTURBANCE: ICD-10-CM

## 2018-02-05 DIAGNOSIS — R31.0 GROSS HEMATURIA: ICD-10-CM

## 2018-02-05 DIAGNOSIS — F41.9 ANXIETY DISORDER, UNSPECIFIED: ICD-10-CM

## 2018-02-05 DIAGNOSIS — I12.9 HYPERTENSIVE CHRONIC KIDNEY DISEASE WITH STAGE 1 THROUGH STAGE 4 CHRONIC KIDNEY DISEASE, OR UNSPECIFIED CHRONIC KIDNEY DISEASE: ICD-10-CM

## 2018-02-05 DIAGNOSIS — Z66 DO NOT RESUSCITATE: ICD-10-CM

## 2018-02-05 DIAGNOSIS — Z96.643 PRESENCE OF ARTIFICIAL HIP JOINT, BILATERAL: ICD-10-CM

## 2018-02-05 DIAGNOSIS — Z88.7 ALLERGY STATUS TO SERUM AND VACCINE: ICD-10-CM

## 2018-02-05 DIAGNOSIS — B96.4 PROTEUS (MIRABILIS) (MORGANII) AS THE CAUSE OF DISEASES CLASSIFIED ELSEWHERE: ICD-10-CM

## 2018-02-05 DIAGNOSIS — Z88.0 ALLERGY STATUS TO PENICILLIN: ICD-10-CM

## 2018-02-05 DIAGNOSIS — N18.3 CHRONIC KIDNEY DISEASE, STAGE 3 (MODERATE): ICD-10-CM

## 2018-02-05 DIAGNOSIS — N39.0 URINARY TRACT INFECTION, SITE NOT SPECIFIED: ICD-10-CM

## 2018-02-05 DIAGNOSIS — A41.9 SEPSIS, UNSPECIFIED ORGANISM: ICD-10-CM

## 2018-02-05 DIAGNOSIS — E03.9 HYPOTHYROIDISM, UNSPECIFIED: ICD-10-CM

## 2018-02-05 DIAGNOSIS — E44.0 MODERATE PROTEIN-CALORIE MALNUTRITION: ICD-10-CM

## 2018-02-05 DIAGNOSIS — N17.9 ACUTE KIDNEY FAILURE, UNSPECIFIED: ICD-10-CM

## 2018-02-05 DIAGNOSIS — I25.10 ATHEROSCLEROTIC HEART DISEASE OF NATIVE CORONARY ARTERY WITHOUT ANGINA PECTORIS: ICD-10-CM

## 2018-02-05 DIAGNOSIS — D50.0 IRON DEFICIENCY ANEMIA SECONDARY TO BLOOD LOSS (CHRONIC): ICD-10-CM

## 2018-02-05 DIAGNOSIS — E86.0 DEHYDRATION: ICD-10-CM

## 2018-02-05 DIAGNOSIS — E87.5 HYPERKALEMIA: ICD-10-CM

## 2018-02-05 DIAGNOSIS — G92 TOXIC ENCEPHALOPATHY: ICD-10-CM

## 2018-02-05 DIAGNOSIS — E78.5 HYPERLIPIDEMIA, UNSPECIFIED: ICD-10-CM

## 2018-02-05 DIAGNOSIS — N13.2 HYDRONEPHROSIS WITH RENAL AND URETERAL CALCULOUS OBSTRUCTION: ICD-10-CM

## 2018-02-05 DIAGNOSIS — E87.2 ACIDOSIS: ICD-10-CM

## 2018-02-05 DIAGNOSIS — Z51.5 ENCOUNTER FOR PALLIATIVE CARE: ICD-10-CM

## 2023-04-19 NOTE — PROVIDER CONTACT NOTE (OTHER) - ASSESSMENT
From: Cari Mathis  To: Shirin Amado  Sent: 4/11/2023 12:35 PM EDT  Subject: Recommendation     I have had a low libido for years but lately it has been worse. Do you have any recommendations for me? I appreciate any help you can give me.    Thank you.   Gross Hematuria

## 2024-10-04 NOTE — DISCHARGE NOTE ADULT - NS DC ANGIO PCI YN
Writer called and spoke with patient, conveyed provider information. Patient said that she is still symptomatic and wants another UA performed. Patient will be seeing endocrinology and GI next week. Patient said that she will go to UC this weekend if she is feeling miserable, otherwise, she will do another UA.   no